# Patient Record
Sex: MALE | Race: WHITE | NOT HISPANIC OR LATINO | Employment: OTHER | URBAN - NONMETROPOLITAN AREA
[De-identification: names, ages, dates, MRNs, and addresses within clinical notes are randomized per-mention and may not be internally consistent; named-entity substitution may affect disease eponyms.]

---

## 2023-09-27 PROCEDURE — 88305 TISSUE EXAM BY PATHOLOGIST: CPT | Performed by: SURGERY

## 2023-09-28 ENCOUNTER — LAB REQUISITION (OUTPATIENT)
Dept: LAB | Facility: HOSPITAL | Age: 68
End: 2023-09-28
Payer: MEDICARE

## 2023-09-30 LAB
CYTO UR: NORMAL
LAB AP CASE REPORT: NORMAL
LAB AP CLINICAL INFORMATION: NORMAL
Lab: NORMAL
PATH REPORT.FINAL DX SPEC: NORMAL
PATH REPORT.GROSS SPEC: NORMAL

## 2024-04-25 ENCOUNTER — HOSPITAL ENCOUNTER (OUTPATIENT)
Dept: PREADMISSION TESTING | Age: 69
Discharge: HOME | End: 2024-04-29
Payer: MEDICARE

## 2024-04-25 LAB
ANION GAP SERPL CALCULATED.3IONS-SCNC: 10 MMOL/L (ref 7–19)
APTT PPP: 32.2 SEC (ref 26–36.2)
BUN SERPL-MCNC: 11 MG/DL (ref 8–23)
CALCIUM SERPL-MCNC: 9.5 MG/DL (ref 8.8–10.2)
CHLORIDE SERPL-SCNC: 100 MMOL/L (ref 98–111)
CO2 SERPL-SCNC: 31 MMOL/L (ref 22–29)
CREAT SERPL-MCNC: 0.8 MG/DL (ref 0.5–1.2)
ERYTHROCYTE [DISTWIDTH] IN BLOOD BY AUTOMATED COUNT: 13.7 % (ref 11.5–14.5)
GLUCOSE SERPL-MCNC: 80 MG/DL (ref 74–109)
HCT VFR BLD AUTO: 46.8 % (ref 42–52)
HGB BLD-MCNC: 15 G/DL (ref 14–18)
INR PPP: 1.1 (ref 0.88–1.18)
MCH RBC QN AUTO: 28.7 PG (ref 27–31)
MCHC RBC AUTO-ENTMCNC: 32.1 G/DL (ref 33–37)
MCV RBC AUTO: 89.5 FL (ref 80–94)
MRSA DNA SPEC QL NAA+PROBE: NOT DETECTED
PLATELET # BLD AUTO: 194 K/UL (ref 130–400)
PMV BLD AUTO: 10 FL (ref 9.4–12.4)
POTASSIUM SERPL-SCNC: 4.4 MMOL/L (ref 3.5–5)
PROTHROMBIN TIME: 13.8 SEC (ref 12–14.6)
RBC # BLD AUTO: 5.23 M/UL (ref 4.7–6.1)
SODIUM SERPL-SCNC: 141 MMOL/L (ref 136–145)
WBC # BLD AUTO: 8.4 K/UL (ref 4.8–10.8)

## 2024-04-25 PROCEDURE — 80048 BASIC METABOLIC PNL TOTAL CA: CPT

## 2024-04-25 PROCEDURE — 85730 THROMBOPLASTIN TIME PARTIAL: CPT

## 2024-04-25 PROCEDURE — 87641 MR-STAPH DNA AMP PROBE: CPT

## 2024-04-25 PROCEDURE — 85610 PROTHROMBIN TIME: CPT

## 2024-04-25 PROCEDURE — 93005 ELECTROCARDIOGRAM TRACING: CPT | Performed by: ANESTHESIOLOGY

## 2024-04-25 PROCEDURE — 85027 COMPLETE CBC AUTOMATED: CPT

## 2024-04-25 RX ORDER — MELOXICAM 7.5 MG/1
7.5 TABLET ORAL DAILY
COMMUNITY

## 2024-04-25 RX ORDER — TAMSULOSIN HYDROCHLORIDE 0.4 MG/1
0.4 CAPSULE ORAL DAILY
COMMUNITY

## 2024-04-25 RX ORDER — ASPIRIN 81 MG/1
81 TABLET ORAL DAILY
COMMUNITY

## 2024-04-25 RX ORDER — MULTIVIT-MIN/FA/LYCOPEN/LUTEIN .4-300-25
1 TABLET ORAL DAILY
COMMUNITY

## 2024-04-25 RX ORDER — LEVOTHYROXINE SODIUM 0.03 MG/1
25 TABLET ORAL DAILY
COMMUNITY

## 2024-04-25 NOTE — PROGRESS NOTES
Patient states he is coming to surgery by public transportation. I explained to patient that he can come by transport but could not be released to them. He stated he has no one he can have help him and he would not be able to find a ride. He also stated he needed to be kept in the hospital for a day or two because he lives by himself and does not have anyone to help him. Doctor Nolvia's nurse notified of all the above.

## 2024-04-25 NOTE — DISCHARGE INSTRUCTIONS
You will be contacted by someone from same-day surgery between 12-3 pm the day before your surgery regarding your arrival time. Please check your voicemail as they may leave a message with that information.  If you do not receive a call or voicemail or you have a problem please call [phone].    If you are running late the morning of your surgery or you wake up with signs/symptoms of COVID call [phone] for instructions    You will be scheduled to arrive 11/2-2 hours prior to your surgery time. Do not come earlier than when you are told to arrive.  You will come to the [facility] to register and be taken to the outpatient services area.    Do Not eat or drink anything after midnight. That includes candy, gum and mints, coffee, tea and water.    If appropriate, you may have a small sip of water with medications as instructed by your nurse/surgeon.  Take your levothyroxine and tamsulosin the morning of surgery.  This is for your protection and to avoid food and liquid from getting into your lungs.     Always, follow your surgeon's instructions on any blood thinner or aspirin use before surgery.    Brush your teeth and shower the morning of surgery.  Chlorhexidine Gluconate 4% Solution  Use the night of the 5th, the night of the 6th and the morning of the 7th.  Patient should shower with this soap a minimum of 3 consecutive showers (2 nights before surgery, the night before surgery and the morning of surgery) washing from the neck down (avoiding contact with genitalia).      DO NOT WASH YOUR HAIR OR FACE WITH THIS SOAP.  When washing with this soap, apply enough to suds up the body thoroughly, turn the water away from your body and allow the soap suds to remain on the body for 2 full minutes, then rinse body completely.      After using this soap on the body, please do not apply powders or lotions to your body.  After the shower the night before surgery, please dry off with a new towel, sleep in

## 2024-04-27 LAB
EKG P AXIS: -18 DEGREES
EKG P-R INTERVAL: 168 MS
EKG Q-T INTERVAL: 366 MS
EKG QRS DURATION: 92 MS
EKG QTC CALCULATION (BAZETT): 387 MS
EKG T AXIS: 20 DEGREES

## 2024-04-27 PROCEDURE — 93010 ELECTROCARDIOGRAM REPORT: CPT | Performed by: INTERNAL MEDICINE

## 2024-05-06 NOTE — DISCHARGE INSTR - DIET
Good nutrition is important when healing from an illness, injury, or surgery.  Follow any nutrition recommendations given to you during your hospital stay.   If you were given an oral nutrition supplement while in the hospital, continue to take this supplement at home.  You can take it with meals, in-between meals, and/or before bedtime. These supplements can be purchased at most local grocery stores, pharmacies, and chain [employer]stores.   If you have any questions about your diet or nutrition, call the hospital and ask for the dietitian.             Low carb / High protein

## 2024-05-06 NOTE — DISCHARGE INSTRUCTIONS
[facility]  Dr. Pj Tran      Total Knee and Uni Knee Replacement  Discharge Instructions     To prevent blood clots, you have been placed on the following medication:  Aspirin 81 mg twice a day for four weeks    Surgical Site Care:  Showering is permitted on post op day 2 - Thursday  No submersion in a bath, swimming pool, whirlpool, etc    Physical Therapy:  You were given a prescription for outpatient therapy.  Please schedule with [facility]  if convenient by calling 1 [phone]. Otherwise, schedule with a therapist closer to your home  Work on range of motion.    Goal for your first office visit is for you to fully straighten and bend your knee to at least a right angle   Don't walk for exercise (it will make you more sore)  Weight Bearing Status:  Full weight bearing with a walker     Pain Medications  You were given a prescription to fill at your pharmacy  Wean off pain medications as you deem appropriate as long as pain is under control  Take tylenol instead of the prescribed pain medicine as your pain improves    Cold packs                                                                                                               FEVER .5 or less        May be used as necessary                                                                    Take Tylenol x 2                                                                                                            Deep breath x 10   Please take a stool softener such as dulcolax or colace daily                                 Cough, cough, cough                                                                                                                        Recheck in 1 - 11/2                                                                                                                                            hours  Do not drive until surgeon releases you  DO NOT SMOKE, VAPE OR CHEW!!!    Contact office

## 2024-05-07 ENCOUNTER — ANESTHESIA EVENT (OUTPATIENT)
Dept: OPERATING ROOM | Age: 69
End: 2024-05-07
Payer: MEDICARE

## 2024-05-07 ENCOUNTER — ANESTHESIA (OUTPATIENT)
Dept: OPERATING ROOM | Age: 69
End: 2024-05-07
Payer: MEDICARE

## 2024-05-07 ENCOUNTER — HOSPITAL ENCOUNTER (OUTPATIENT)
Age: 69
Setting detail: OBSERVATION
Discharge: HOME HEALTH | End: 2024-05-08
Attending: ORTHOPAEDIC SURGERY | Admitting: ORTHOPAEDIC SURGERY
Payer: MEDICARE

## 2024-05-07 DIAGNOSIS — M17.11 PRIMARY OSTEOARTHRITIS OF RIGHT KNEE: Primary | ICD-10-CM

## 2024-05-07 PROCEDURE — 2720000010 HC SURG SUPPLY STERILE: Performed by: ORTHOPAEDIC SURGERY

## 2024-05-07 PROCEDURE — 6360000002 HC RX W HCPCS: Performed by: ORTHOPAEDIC SURGERY

## 2024-05-07 PROCEDURE — 2500000003 HC RX 250 WO HCPCS: Performed by: ORTHOPAEDIC SURGERY

## 2024-05-07 PROCEDURE — 2580000003 HC RX 258: Performed by: ORTHOPAEDIC SURGERY

## 2024-05-07 PROCEDURE — 2709999900 HC NON-CHARGEABLE SUPPLY: Performed by: ORTHOPAEDIC SURGERY

## 2024-05-07 PROCEDURE — 64447 NJX AA&/STRD FEMORAL NRV IMG: CPT

## 2024-05-07 PROCEDURE — C1713 ANCHOR/SCREW BN/BN,TIS/BN: HCPCS | Performed by: ORTHOPAEDIC SURGERY

## 2024-05-07 PROCEDURE — 6370000000 HC RX 637 (ALT 250 FOR IP): Performed by: ORTHOPAEDIC SURGERY

## 2024-05-07 PROCEDURE — 2500000003 HC RX 250 WO HCPCS

## 2024-05-07 PROCEDURE — 3700000001 HC ADD 15 MINUTES (ANESTHESIA): Performed by: ORTHOPAEDIC SURGERY

## 2024-05-07 PROCEDURE — A4217 STERILE WATER/SALINE, 500 ML: HCPCS | Performed by: ORTHOPAEDIC SURGERY

## 2024-05-07 PROCEDURE — 3600000015 HC SURGERY LEVEL 5 ADDTL 15MIN: Performed by: ORTHOPAEDIC SURGERY

## 2024-05-07 PROCEDURE — 7100000001 HC PACU RECOVERY - ADDTL 15 MIN: Performed by: ORTHOPAEDIC SURGERY

## 2024-05-07 PROCEDURE — 2580000003 HC RX 258

## 2024-05-07 PROCEDURE — 3700000000 HC ANESTHESIA ATTENDED CARE: Performed by: ORTHOPAEDIC SURGERY

## 2024-05-07 PROCEDURE — G0378 HOSPITAL OBSERVATION PER HR: HCPCS

## 2024-05-07 PROCEDURE — 2500000003 HC RX 250 WO HCPCS: Performed by: ANESTHESIOLOGY

## 2024-05-07 PROCEDURE — C1776 JOINT DEVICE (IMPLANTABLE): HCPCS | Performed by: ORTHOPAEDIC SURGERY

## 2024-05-07 PROCEDURE — 7100000000 HC PACU RECOVERY - FIRST 15 MIN: Performed by: ORTHOPAEDIC SURGERY

## 2024-05-07 PROCEDURE — 6360000002 HC RX W HCPCS: Performed by: ANESTHESIOLOGY

## 2024-05-07 PROCEDURE — 94760 N-INVAS EAR/PLS OXIMETRY 1: CPT

## 2024-05-07 PROCEDURE — 3600000005 HC SURGERY LEVEL 5 BASE: Performed by: ORTHOPAEDIC SURGERY

## 2024-05-07 PROCEDURE — 6360000002 HC RX W HCPCS

## 2024-05-07 PROCEDURE — 94150 VITAL CAPACITY TEST: CPT

## 2024-05-07 DEVICE — CEMENT BONE 40GM HI VISC PALACOS R: Type: IMPLANTABLE DEVICE | Site: KNEE | Status: FUNCTIONAL

## 2024-05-07 DEVICE — IMPLANTABLE DEVICE: Type: IMPLANTABLE DEVICE | Site: KNEE | Status: FUNCTIONAL

## 2024-05-07 DEVICE — PSN TIB STM 5 DEG SZ G R: Type: IMPLANTABLE DEVICE | Site: KNEE | Status: FUNCTIONAL

## 2024-05-07 DEVICE — IMPL KNEE FEM PSN PS CMT STD SZ12 RT: Type: IMPLANTABLE DEVICE | Site: KNEE | Status: FUNCTIONAL

## 2024-05-07 DEVICE — IMPL KNEE PSN ALL POLY PAT 38MM: Type: IMPLANTABLE DEVICE | Site: KNEE | Status: FUNCTIONAL

## 2024-05-07 RX ORDER — SODIUM CHLORIDE 0.9 % (FLUSH) 0.9 %
5-40 SYRINGE (ML) INJECTION EVERY 12 HOURS SCHEDULED
Status: DISCONTINUED | OUTPATIENT
Start: 2024-05-07 | End: 2024-05-08 | Stop reason: HOSPADM

## 2024-05-07 RX ORDER — ROPIVACAINE HYDROCHLORIDE 2 MG/ML
INJECTION, SOLUTION EPIDURAL; INFILTRATION; PERINEURAL PRN
Status: DISCONTINUED | OUTPATIENT
Start: 2024-05-07 | End: 2024-05-07 | Stop reason: ALTCHOICE

## 2024-05-07 RX ORDER — LIDOCAINE HYDROCHLORIDE 10 MG/ML
INJECTION, SOLUTION INFILTRATION; PERINEURAL
Status: COMPLETED | OUTPATIENT
Start: 2024-05-07 | End: 2024-05-07

## 2024-05-07 RX ORDER — SODIUM CHLORIDE 9 MG/ML
INJECTION, SOLUTION INTRAVENOUS PRN
Status: DISCONTINUED | OUTPATIENT
Start: 2024-05-07 | End: 2024-05-07 | Stop reason: HOSPADM

## 2024-05-07 RX ORDER — FENTANYL CITRATE 50 UG/ML
INJECTION, SOLUTION INTRAMUSCULAR; INTRAVENOUS PRN
Status: DISCONTINUED | OUTPATIENT
Start: 2024-05-07 | End: 2024-05-07 | Stop reason: SDUPTHER

## 2024-05-07 RX ORDER — NALOXONE HYDROCHLORIDE 0.4 MG/ML
INJECTION, SOLUTION INTRAMUSCULAR; INTRAVENOUS; SUBCUTANEOUS PRN
Status: DISCONTINUED | OUTPATIENT
Start: 2024-05-07 | End: 2024-05-07 | Stop reason: HOSPADM

## 2024-05-07 RX ORDER — ACETAMINOPHEN 325 MG/1
650 TABLET ORAL EVERY 6 HOURS
Status: DISCONTINUED | OUTPATIENT
Start: 2024-05-07 | End: 2024-05-08 | Stop reason: HOSPADM

## 2024-05-07 RX ORDER — ASPIRIN 325 MG
325 TABLET, DELAYED RELEASE (ENTERIC COATED) ORAL 2 TIMES DAILY
Status: DISCONTINUED | OUTPATIENT
Start: 2024-05-07 | End: 2024-05-08 | Stop reason: HOSPADM

## 2024-05-07 RX ORDER — SODIUM CHLORIDE, SODIUM LACTATE, POTASSIUM CHLORIDE, CALCIUM CHLORIDE 600; 310; 30; 20 MG/100ML; MG/100ML; MG/100ML; MG/100ML
INJECTION, SOLUTION INTRAVENOUS CONTINUOUS PRN
Status: DISCONTINUED | OUTPATIENT
Start: 2024-05-07 | End: 2024-05-07 | Stop reason: SDUPTHER

## 2024-05-07 RX ORDER — CELECOXIB 100 MG/1
100 CAPSULE ORAL ONCE
Status: COMPLETED | OUTPATIENT
Start: 2024-05-07 | End: 2024-05-07

## 2024-05-07 RX ORDER — OXYCODONE HYDROCHLORIDE 5 MG/1
5 TABLET ORAL EVERY 4 HOURS PRN
Status: DISCONTINUED | OUTPATIENT
Start: 2024-05-07 | End: 2024-05-08 | Stop reason: HOSPADM

## 2024-05-07 RX ORDER — ONDANSETRON 2 MG/ML
4 INJECTION INTRAMUSCULAR; INTRAVENOUS EVERY 6 HOURS PRN
Status: DISCONTINUED | OUTPATIENT
Start: 2024-05-07 | End: 2024-05-08 | Stop reason: HOSPADM

## 2024-05-07 RX ORDER — MELOXICAM 7.5 MG/1
3.75 TABLET ORAL DAILY
Status: DISCONTINUED | OUTPATIENT
Start: 2024-05-07 | End: 2024-05-08 | Stop reason: HOSPADM

## 2024-05-07 RX ORDER — SODIUM CHLORIDE 0.9 % (FLUSH) 0.9 %
5-40 SYRINGE (ML) INJECTION EVERY 12 HOURS SCHEDULED
Status: DISCONTINUED | OUTPATIENT
Start: 2024-05-07 | End: 2024-05-07 | Stop reason: HOSPADM

## 2024-05-07 RX ORDER — TRANEXAMIC ACID 650 MG/1
1950 TABLET ORAL
Status: COMPLETED | OUTPATIENT
Start: 2024-05-07 | End: 2024-05-07

## 2024-05-07 RX ORDER — SODIUM CHLORIDE 0.9 % (FLUSH) 0.9 %
5-40 SYRINGE (ML) INJECTION PRN
Status: DISCONTINUED | OUTPATIENT
Start: 2024-05-07 | End: 2024-05-07 | Stop reason: HOSPADM

## 2024-05-07 RX ORDER — ROPIVACAINE HYDROCHLORIDE 5 MG/ML
30 INJECTION, SOLUTION EPIDURAL; INFILTRATION; PERINEURAL ONCE
Status: DISCONTINUED | OUTPATIENT
Start: 2024-05-07 | End: 2024-05-07 | Stop reason: HOSPADM

## 2024-05-07 RX ORDER — SODIUM CHLORIDE, SODIUM LACTATE, POTASSIUM CHLORIDE, CALCIUM CHLORIDE 600; 310; 30; 20 MG/100ML; MG/100ML; MG/100ML; MG/100ML
INJECTION, SOLUTION INTRAVENOUS CONTINUOUS
Status: DISCONTINUED | OUTPATIENT
Start: 2024-05-07 | End: 2024-05-07 | Stop reason: HOSPADM

## 2024-05-07 RX ORDER — HYDROMORPHONE HYDROCHLORIDE 1 MG/ML
0.25 INJECTION, SOLUTION INTRAMUSCULAR; INTRAVENOUS; SUBCUTANEOUS EVERY 5 MIN PRN
Status: DISCONTINUED | OUTPATIENT
Start: 2024-05-07 | End: 2024-05-07 | Stop reason: HOSPADM

## 2024-05-07 RX ORDER — TAMSULOSIN HYDROCHLORIDE 0.4 MG/1
0.4 CAPSULE ORAL DAILY
Status: DISCONTINUED | OUTPATIENT
Start: 2024-05-07 | End: 2024-05-08 | Stop reason: HOSPADM

## 2024-05-07 RX ORDER — MORPHINE SULFATE 2 MG/ML
2 INJECTION, SOLUTION INTRAMUSCULAR; INTRAVENOUS
Status: DISCONTINUED | OUTPATIENT
Start: 2024-05-07 | End: 2024-05-08 | Stop reason: HOSPADM

## 2024-05-07 RX ORDER — HYDROMORPHONE HYDROCHLORIDE 1 MG/ML
0.5 INJECTION, SOLUTION INTRAMUSCULAR; INTRAVENOUS; SUBCUTANEOUS EVERY 5 MIN PRN
Status: DISCONTINUED | OUTPATIENT
Start: 2024-05-07 | End: 2024-05-07 | Stop reason: HOSPADM

## 2024-05-07 RX ORDER — EPHEDRINE SULFATE/0.9% NACL/PF 25 MG/5 ML
SYRINGE (ML) INTRAVENOUS PRN
Status: DISCONTINUED | OUTPATIENT
Start: 2024-05-07 | End: 2024-05-07 | Stop reason: SDUPTHER

## 2024-05-07 RX ORDER — PROPOFOL 10 MG/ML
INJECTION, EMULSION INTRAVENOUS CONTINUOUS PRN
Status: DISCONTINUED | OUTPATIENT
Start: 2024-05-07 | End: 2024-05-07 | Stop reason: SDUPTHER

## 2024-05-07 RX ORDER — ROPIVACAINE HYDROCHLORIDE 5 MG/ML
INJECTION, SOLUTION EPIDURAL; INFILTRATION; PERINEURAL
Status: COMPLETED | OUTPATIENT
Start: 2024-05-07 | End: 2024-05-07

## 2024-05-07 RX ORDER — MIDAZOLAM HYDROCHLORIDE 2 MG/2ML
2 INJECTION, SOLUTION INTRAMUSCULAR; INTRAVENOUS ONCE
Status: COMPLETED | OUTPATIENT
Start: 2024-05-07 | End: 2024-05-07

## 2024-05-07 RX ORDER — ACETAMINOPHEN 500 MG
1000 TABLET ORAL ONCE
Status: COMPLETED | OUTPATIENT
Start: 2024-05-07 | End: 2024-05-07

## 2024-05-07 RX ORDER — OXYCODONE HCL 10 MG/1
10 TABLET, FILM COATED, EXTENDED RELEASE ORAL ONCE
Status: COMPLETED | OUTPATIENT
Start: 2024-05-07 | End: 2024-05-07

## 2024-05-07 RX ORDER — OXYCODONE HYDROCHLORIDE 10 MG/1
10 TABLET ORAL EVERY 4 HOURS PRN
Status: DISCONTINUED | OUTPATIENT
Start: 2024-05-07 | End: 2024-05-08 | Stop reason: HOSPADM

## 2024-05-07 RX ORDER — LEVOTHYROXINE SODIUM 0.03 MG/1
25 TABLET ORAL DAILY
Status: DISCONTINUED | OUTPATIENT
Start: 2024-05-08 | End: 2024-05-08 | Stop reason: HOSPADM

## 2024-05-07 RX ORDER — DEXAMETHASONE SODIUM PHOSPHATE 10 MG/ML
8 INJECTION, SOLUTION INTRAMUSCULAR; INTRAVENOUS ONCE
Status: DISCONTINUED | OUTPATIENT
Start: 2024-05-07 | End: 2024-05-07 | Stop reason: HOSPADM

## 2024-05-07 RX ORDER — BUPIVACAINE HYDROCHLORIDE 7.5 MG/ML
INJECTION, SOLUTION INTRASPINAL PRN
Status: DISCONTINUED | OUTPATIENT
Start: 2024-05-07 | End: 2024-05-07 | Stop reason: SDUPTHER

## 2024-05-07 RX ORDER — SODIUM CHLORIDE 9 MG/ML
INJECTION, SOLUTION INTRAVENOUS PRN
Status: DISCONTINUED | OUTPATIENT
Start: 2024-05-07 | End: 2024-05-08 | Stop reason: HOSPADM

## 2024-05-07 RX ORDER — DEXMEDETOMIDINE HYDROCHLORIDE 100 UG/ML
INJECTION, SOLUTION INTRAVENOUS PRN
Status: DISCONTINUED | OUTPATIENT
Start: 2024-05-07 | End: 2024-05-07 | Stop reason: SDUPTHER

## 2024-05-07 RX ORDER — SCOLOPAMINE TRANSDERMAL SYSTEM 1 MG/1
1 PATCH, EXTENDED RELEASE TRANSDERMAL ONCE
Status: DISCONTINUED | OUTPATIENT
Start: 2024-05-07 | End: 2024-05-07

## 2024-05-07 RX ORDER — M-VIT,TX,IRON,MINS/CALC/FOLIC 27MG-0.4MG
1 TABLET ORAL DAILY
Status: DISCONTINUED | OUTPATIENT
Start: 2024-05-07 | End: 2024-05-08 | Stop reason: HOSPADM

## 2024-05-07 RX ORDER — MORPHINE SULFATE 4 MG/ML
4 INJECTION, SOLUTION INTRAMUSCULAR; INTRAVENOUS
Status: DISCONTINUED | OUTPATIENT
Start: 2024-05-07 | End: 2024-05-08 | Stop reason: HOSPADM

## 2024-05-07 RX ORDER — SODIUM CHLORIDE 0.9 % (FLUSH) 0.9 %
5-40 SYRINGE (ML) INJECTION PRN
Status: DISCONTINUED | OUTPATIENT
Start: 2024-05-07 | End: 2024-05-08 | Stop reason: HOSPADM

## 2024-05-07 RX ADMIN — OXYCODONE 5 MG: 5 TABLET ORAL at 19:16

## 2024-05-07 RX ADMIN — MORPHINE SULFATE 2 MG: 2 INJECTION, SOLUTION INTRAMUSCULAR; INTRAVENOUS at 19:50

## 2024-05-07 RX ADMIN — CELECOXIB 100 MG: 100 CAPSULE ORAL at 08:46

## 2024-05-07 RX ADMIN — ASPIRIN 325 MG: 325 TABLET, COATED ORAL at 21:48

## 2024-05-07 RX ADMIN — SODIUM CHLORIDE, SODIUM LACTATE, POTASSIUM CHLORIDE, AND CALCIUM CHLORIDE: 600; 310; 30; 20 INJECTION, SOLUTION INTRAVENOUS at 10:38

## 2024-05-07 RX ADMIN — MORPHINE SULFATE 2 MG: 2 INJECTION, SOLUTION INTRAMUSCULAR; INTRAVENOUS at 21:48

## 2024-05-07 RX ADMIN — FENTANYL CITRATE 25 MCG: 50 INJECTION, SOLUTION INTRAMUSCULAR; INTRAVENOUS at 10:52

## 2024-05-07 RX ADMIN — EPHEDRINE SULFATE 5 MG: 5 INJECTION INTRAVENOUS at 12:01

## 2024-05-07 RX ADMIN — OXYCODONE 5 MG: 5 TABLET ORAL at 18:17

## 2024-05-07 RX ADMIN — FENTANYL CITRATE 50 MCG: 0.05 INJECTION, SOLUTION INTRAMUSCULAR; INTRAVENOUS at 10:38

## 2024-05-07 RX ADMIN — FENTANYL CITRATE 50 MCG: 0.05 INJECTION, SOLUTION INTRAMUSCULAR; INTRAVENOUS at 10:45

## 2024-05-07 RX ADMIN — BUPIVACAINE HYDROCHLORIDE IN DEXTROSE 1.9 MG: 7.5 INJECTION, SOLUTION SUBARACHNOID at 10:52

## 2024-05-07 RX ADMIN — Medication 1 TABLET: at 15:00

## 2024-05-07 RX ADMIN — DEXMEDETOMIDINE HYDROCHLORIDE 8 MCG: 100 INJECTION, SOLUTION, CONCENTRATE INTRAVENOUS at 11:07

## 2024-05-07 RX ADMIN — DEXMEDETOMIDINE HYDROCHLORIDE 8 MCG: 100 INJECTION, SOLUTION, CONCENTRATE INTRAVENOUS at 11:14

## 2024-05-07 RX ADMIN — PROPOFOL 70 MCG/KG/MIN: 10 INJECTION, EMULSION INTRAVENOUS at 10:52

## 2024-05-07 RX ADMIN — ACETAMINOPHEN 650 MG: 325 TABLET ORAL at 21:48

## 2024-05-07 RX ADMIN — DEXMEDETOMIDINE HYDROCHLORIDE 8 MCG: 100 INJECTION, SOLUTION, CONCENTRATE INTRAVENOUS at 11:03

## 2024-05-07 RX ADMIN — DEXMEDETOMIDINE HYDROCHLORIDE 8 MCG: 100 INJECTION, SOLUTION, CONCENTRATE INTRAVENOUS at 10:58

## 2024-05-07 RX ADMIN — LIDOCAINE HYDROCHLORIDE 1 ML: 10 INJECTION, SOLUTION INFILTRATION; PERINEURAL at 10:33

## 2024-05-07 RX ADMIN — DEXMEDETOMIDINE HYDROCHLORIDE 10 MCG: 100 INJECTION, SOLUTION, CONCENTRATE INTRAVENOUS at 12:27

## 2024-05-07 RX ADMIN — SODIUM CHLORIDE, POTASSIUM CHLORIDE, SODIUM LACTATE AND CALCIUM CHLORIDE: 600; 310; 30; 20 INJECTION, SOLUTION INTRAVENOUS at 08:46

## 2024-05-07 RX ADMIN — OXYCODONE HYDROCHLORIDE 10 MG: 10 TABLET, FILM COATED, EXTENDED RELEASE ORAL at 08:47

## 2024-05-07 RX ADMIN — MIDAZOLAM 2 MG: 1 INJECTION INTRAMUSCULAR; INTRAVENOUS at 10:28

## 2024-05-07 RX ADMIN — TAMSULOSIN HYDROCHLORIDE 0.4 MG: 0.4 CAPSULE ORAL at 15:00

## 2024-05-07 RX ADMIN — TRANEXAMIC ACID 1950 MG: 650 TABLET ORAL at 08:46

## 2024-05-07 RX ADMIN — ONDANSETRON 4 MG: 2 INJECTION INTRAMUSCULAR; INTRAVENOUS at 19:44

## 2024-05-07 RX ADMIN — ACETAMINOPHEN 1000 MG: 500 TABLET ORAL at 08:46

## 2024-05-07 RX ADMIN — EPHEDRINE SULFATE 5 MG: 5 INJECTION INTRAVENOUS at 11:27

## 2024-05-07 RX ADMIN — ACETAMINOPHEN 650 MG: 325 TABLET ORAL at 14:59

## 2024-05-07 RX ADMIN — CEFAZOLIN 2000 MG: 2 INJECTION, POWDER, FOR SOLUTION INTRAMUSCULAR; INTRAVENOUS at 10:52

## 2024-05-07 RX ADMIN — WATER 2000 MG: 1 INJECTION INTRAMUSCULAR; INTRAVENOUS; SUBCUTANEOUS at 18:21

## 2024-05-07 RX ADMIN — DEXMEDETOMIDINE HYDROCHLORIDE 8 MCG: 100 INJECTION, SOLUTION, CONCENTRATE INTRAVENOUS at 10:52

## 2024-05-07 RX ADMIN — MELOXICAM 3.75 MG: 7.5 TABLET ORAL at 14:59

## 2024-05-07 RX ADMIN — OXYCODONE HYDROCHLORIDE 10 MG: 10 TABLET ORAL at 22:58

## 2024-05-07 RX ADMIN — ROPIVACAINE HYDROCHLORIDE 20 ML: 5 INJECTION, SOLUTION EPIDURAL; INFILTRATION; PERINEURAL at 10:33

## 2024-05-07 SDOH — ECONOMIC STABILITY: FOOD INSECURITY: WITHIN THE PAST 12 MONTHS, YOU WORRIED THAT YOUR FOOD WOULD RUN OUT BEFORE YOU GOT MONEY TO BUY MORE.: NEVER TRUE

## 2024-05-07 SDOH — ECONOMIC STABILITY: INCOME INSECURITY: IN THE PAST 12 MONTHS, HAS THE ELECTRIC, GAS, OIL, OR WATER COMPANY THREATENED TO SHUT OFF SERVICE IN YOUR HOME?: NO

## 2024-05-07 SDOH — ECONOMIC STABILITY: INCOME INSECURITY: HOW HARD IS IT FOR YOU TO PAY FOR THE VERY BASICS LIKE FOOD, HOUSING, MEDICAL CARE, AND HEATING?: NOT VERY HARD

## 2024-05-07 ASSESSMENT — PAIN DESCRIPTION - LOCATION
LOCATION: LEG
LOCATION: HIP
LOCATION: HIP
LOCATION: LEG

## 2024-05-07 ASSESSMENT — PATIENT HEALTH QUESTIONNAIRE - PHQ9
SUM OF ALL RESPONSES TO PHQ QUESTIONS 1-9: 0
1. LITTLE INTEREST OR PLEASURE IN DOING THINGS: NOT AT ALL
SUM OF ALL RESPONSES TO PHQ QUESTIONS 1-9: 0
SUM OF ALL RESPONSES TO PHQ9 QUESTIONS 1 & 2: 0
2. FEELING DOWN, DEPRESSED OR HOPELESS: NOT AT ALL

## 2024-05-07 ASSESSMENT — PAIN SCALES - GENERAL
PAINLEVEL_OUTOF10: 6
PAINLEVEL_OUTOF10: 6
PAINLEVEL_OUTOF10: 8
PAINLEVEL_OUTOF10: 0
PAINLEVEL_OUTOF10: 4

## 2024-05-07 ASSESSMENT — LIFESTYLE VARIABLES: SMOKING_STATUS: 0

## 2024-05-07 NOTE — ANESTHESIA POSTPROCEDURE EVALUATION
Department of Anesthesiology  Postprocedure Note    Patient: Grant Loaiza  MRN: [id number]  YOB: 1955  Date of evaluation: 5/7/2024    Procedure Summary       Date: 05/07/24 Room / Location: [address]    Anesthesia Start: 1038 Anesthesia Stop: 1233    Procedure: ROBOTIC RIGHT TOTAL KNEE ARTHROPLASTY (Right: Knee) Diagnosis:       Bilateral primary osteoarthritis of knee      (Bilateral primary osteoarthritis of knee [M17.0])    Surgeons: Isaac De Souza MD Responsible Provider: Maverick Laureano APRN - CRNA    Anesthesia Type: Regional, Spinal, MAC ASA Status: 2            Anesthesia Type: Regional, Spinal, MAC    Guru Phase I: Guru Score: 10    Guru Phase II:      Anesthesia Post Evaluation    Patient location during evaluation: PACU  Patient participation: complete - patient participated  Level of consciousness: awake and alert  Pain score: 0  Airway patency: patent  Nausea & Vomiting: no vomiting and no nausea  Cardiovascular status: blood pressure returned to baseline and hemodynamically stable  Respiratory status: acceptable, nonlabored ventilation, room air and spontaneous ventilation  Hydration status: euvolemic  Comments: /81   Pulse 66   Temp 97.2 °F (36.2 °C) (Temporal)   Resp 18   Ht 1.803 m (5' 11\")   Wt 80.7 kg (178 lb)   SpO2 94%   BMI 24.83 kg/m²     Multimodal analgesia pain management approach  Pain management: adequate    No notable events documented.

## 2024-05-07 NOTE — H&P
[facility] Pre-Operative History and Physical    Patient Name: Shannan  : 1955        Chief Complaint: Right knee pain  History of Present Illness:   This patient has had ongoing pain for several weeks/months that has been unresponsive to conservative care which has included injection, therapy, activity modification and presents now for surgery.    Past Medical History:       Diagnosis Date    Anxiety     Arthritis     BPH (benign prostatic hyperplasia)     Thyroid disease      Past Surgical History:       Procedure Laterality Date    CATARACT EXTRACTION EXTRACAPSULAR W/ INTRAOCULAR LENS IMPLANTATION Bilateral     CYST REMOVAL      right hand    EAR SURGERY Left     reattachment    LEG SURGERY Left     tendon reattached    SKIN BIOPSY      TONSILLECTOMY         Medications:   Prior to Admission medications    Medication Sig Start Date End Date Taking? Authorizing Provider   Multiple Vitamins-Minerals (CENTRUM SILVER ADULT 50+) TABS Take 1 tablet by mouth daily    Martha Ugarte MD   aspirin 81 MG EC tablet Take 1 tablet by mouth daily    Martha Ugarte MD   levothyroxine (SYNTHROID) 25 MCG tablet Take 1 tablet by mouth Daily    Martha Ugarte MD   meloxicam (MOBIC) 7.5 MG tablet Take 1 tablet by mouth daily    Martha Ugarte MD   tamsulosin (FLOMAX) 0.4 MG capsule Take 1 capsule by mouth daily    Martha Ugarte MD       Allergies:  Demerol hcl [meperidine] and Tuberculin tests    Social History:   Tobacco:  reports that he has never smoked. He has never used smokeless tobacco.   Alcohol:  reports that he does not currently use alcohol.    Review of Systems:  General: Denies any fever or chills  EYES: Denies any diplopia  ENT: Tinnitus or vertigo  Resp: Denies any shortness of breath, cough or wheezing  Cardiac: Denies any chest pain, palpitations, claudication or edema  GI: Denies any melena, hematochezia, hematemesis or pyrosis  : Denies any frequency, urgency,

## 2024-05-07 NOTE — ANESTHESIA PRE PROCEDURE
04/25/2024 03:10 PM    K 4.4 04/25/2024 03:10 PM     04/25/2024 03:10 PM    CO2 31 04/25/2024 03:10 PM    BUN 11 04/25/2024 03:10 PM    CREATININE 0.8 04/25/2024 03:10 PM    LABGLOM >90 04/25/2024 03:10 PM    GLUCOSE 80 04/25/2024 03:10 PM    CALCIUM 9.5 04/25/2024 03:10 PM       POC Tests: No results for input(s): \"POCGLU\", \"POCNA\", \"POCK\", \"POCCL\", \"POCBUN\", \"POCHEMO\", \"POCHCT\" in the last 72 hours.    Coags:   Lab Results   Component Value Date/Time    PROTIME 13.8 04/25/2024 03:10 PM    INR 1.10 04/25/2024 03:10 PM    APTT 32.2 04/25/2024 03:10 PM       HCG (If Applicable): No results found for: \"PREGTESTUR\", \"PREGSERUM\", \"HCG\", \"HCGQUANT\"     ABGs: No results found for: \"PHART\", \"PO2ART\", \"OMI3DFP\", \"MNA5UNP\", \"BEART\", \"S8OTNFIR\"     Type & Screen (If Applicable):  No results found for: \"LABABO\"    Drug/Infectious Status (If Applicable):  No results found for: \"HIV\", \"HEPCAB\"    COVID-19 Screening (If Applicable): No results found for: \"COVID19\"        Anesthesia Evaluation  Patient summary reviewed and Nursing notes reviewed   no history of anesthetic complications:   Airway: Mallampati: II  TM distance: >3 FB   Neck ROM: full  Mouth opening: > = 3 FB   Dental:          Pulmonary:normal exam        (-) COPD, asthma and not a current smoker                           Cardiovascular:        (-) past MI and CABG/stent    ECG reviewed               Beta Blocker:  Not on Beta Blocker         Neuro/Psych:      (-) seizures and CVA           GI/Hepatic/Renal:   (+) GERD: well controlled     (-) liver disease and no renal disease       Endo/Other:    (+) hypothyroidism: arthritis:..    (-) diabetes mellitus               Abdominal:             Vascular: negative vascular ROS.         Other Findings:       Anesthesia Plan      regional and spinal     ASA 2     (Adductor canal block)  Induction: intravenous.    MIPS: Postoperative opioids intended and Prophylactic antiemetics administered.  Anesthetic plan and

## 2024-05-07 NOTE — PROGRESS NOTES
4 Eyes Skin Assessment     NAME:  Grant Loaiza  YOB: 1955  MEDICAL RECORD NUMBER:  [id number]    The patient is being assessed for  Post-Op Surgical    I agree that at least one RN has performed a thorough Head to Toe Skin Assessment on the patient. ALL assessment sites listed below have been assessed.      Areas assessed by both nurses:    Head, Face, Ears, Shoulders, Back, Chest, Arms, Elbows, Hands, Sacrum. Buttock, Coccyx, Ischium, and Legs. Feet and Heels        Does the Patient have a Wound? No noted wound(s)       Kurtis Prevention initiated by RN: No  Wound Care Orders initiated by RN: No    Pressure Injury (Stage 3,4, Unstageable, DTI, NWPT, and Complex wounds) if present, place Wound referral order by RN under : No    New Ostomies, if present place, Ostomy referral order under : No     Nurse 1 eSignature: Electronically signed by Kay Murray RN on 5/7/24 at 2:18 PM CDT    **SHARE this note so that the co-signing nurse can place an eSignature**    Nurse 2 eSignature: Electronically signed by Catherine Akins RN on 5/7/24 at 2:24 PM CDT

## 2024-05-07 NOTE — ANESTHESIA PROCEDURE NOTES
Spinal Block    End time: 5/7/2024 10:52 AM  Reason for block: primary anesthetic  Staffing  Performed by: Maverick Laureano APRN - CRNA  Authorized by: Maverick Laureano APRN - CRNA    Spinal Block  Patient position: sitting  Prep: Betadine  Patient monitoring: oxygen, frequent blood pressure checks, continuous capnometry, continuous pulse ox and cardiac monitor  Approach: midline  Location: L3/L4  Provider prep: sterile gloves and mask  Needle  Needle gauge: 24 G  Needle length: 4 in  Assessment  Sensory level: T8  Swirl obtained: Yes  CSF: clear  Attempts: 2  Hemodynamics: stable  Preanesthetic Checklist  Completed: patient identified, IV checked, site marked, risks and benefits discussed, surgical/procedural consents, equipment checked, pre-op evaluation, timeout performed, anesthesia consent given, oxygen available, monitors applied/VS acknowledged, fire risk safety assessment completed and verbalized and blood product R/B/A discussed and consented          
(NAROPIN) injection 0.5% - Perineural   20 mL - 5/7/2024 10:33:00 AM

## 2024-05-07 NOTE — PROGRESS NOTES
Dr. Galvez to bedside, updated on HR trends and BP trends. Ok to floor. No new orders at this time.

## 2024-05-07 NOTE — OP NOTE
TOTAL KNEE ARTHROPLASTY OPERATIVE NOTE    NAME OF SURGEON / : Pj Tran MD  PATIENT:   Grant Loaiza  Date: 5/7/2024        Time: 12:12 PM   Referring Physician: ________________________    PREOP DIAGNOSIS:  right Knee  Primary osteoarthritis   POSTOP DIAGNOSIS:  Same     PROCEDURE:    Right  Cemented Posterior Stabilized Knee arthroplasty, SHRUTHI robot assisted    IMPLANTS:   Implant Name Type Inv. Item Serial No.  Lot No. LRB No. Used Action   CEMENT BONE 40GM HI VISC PALACOS R - [id number]  CEMENT BONE 40GM HI VISC PALACOS R  [facility] [id number] Right 1 Implanted   CEMENT BONE 40GM HI VISC PALACOS R - [id number]  CEMENT BONE 40GM HI VISC PALACOS R  [facility] [id number] Right 1 Implanted   IMPL KNEE PSN ALL POLY PAT 38MM - [id number] Knee IMPL KNEE PSN ALL POLY PAT 38MM  [employer] [id number] Right 1 Implanted   PSN TIB STM 5 DEG SZ G R - [id number]  PSN TIB STM 5 DEG SZ G R  [employer] [id number] Right 1 Implanted   IMPL KNEE FEM PSN PS CMT STD SZ12 RT - [id number] Knee IMPL KNEE FEM PSN PS CMT STD SZ12 RT  [employer] [id number] Right 1 Implanted   IMPL KNEE ARTIC 13MM RT - [id number] Knee IMPL KNEE ARTIC 13MM RT  [employer] [id number] Right 1 Implanted       FINDINGS:  Preop ROM:  Full except for   - 5 degrees  extension  Alignment:    varus  Bone quality:   normal  Cartilage wear:  Medial - severe  Lateral - moderate  Pat-fem -moderate  ACL -Intact    Deviation from robotic plan:  adjusted the femoral external rotation    ASSISTANT: Miguel Burns, certified first assistant.  Helped with draping, exposure, retraction, essential steps of the procedure, and with wound closure.   ANESTHESIA:  General  EBL:  500 mL  TOURNIQUET:  none  FLUIDS: See anesthesia record  BLOOD PRODUCTS:  None  COMPLICATIONS:  None  SPECIMEN:  None            INDICATIONS:  Patient presents for the above procedure having failed conservative treatment.  Patient consents to the

## 2024-05-08 VITALS
HEART RATE: 70 BPM | OXYGEN SATURATION: 99 % | WEIGHT: 178 LBS | RESPIRATION RATE: 16 BRPM | TEMPERATURE: 98.8 F | BODY MASS INDEX: 24.92 KG/M2 | DIASTOLIC BLOOD PRESSURE: 71 MMHG | HEIGHT: 71 IN | SYSTOLIC BLOOD PRESSURE: 112 MMHG

## 2024-05-08 LAB
ANION GAP SERPL CALCULATED.3IONS-SCNC: 9 MMOL/L (ref 7–19)
BUN SERPL-MCNC: 15 MG/DL (ref 8–23)
CALCIUM SERPL-MCNC: 8.3 MG/DL (ref 8.8–10.2)
CHLORIDE SERPL-SCNC: 103 MMOL/L (ref 98–111)
CO2 SERPL-SCNC: 26 MMOL/L (ref 22–29)
CREAT SERPL-MCNC: 0.7 MG/DL (ref 0.5–1.2)
GLUCOSE SERPL-MCNC: 123 MG/DL (ref 74–109)
HCT VFR BLD AUTO: 37.7 % (ref 42–52)
HGB BLD-MCNC: 12.3 G/DL (ref 14–18)
POTASSIUM SERPL-SCNC: 4.2 MMOL/L (ref 3.5–5)
SODIUM SERPL-SCNC: 138 MMOL/L (ref 136–145)

## 2024-05-08 PROCEDURE — 36415 COLL VENOUS BLD VENIPUNCTURE: CPT

## 2024-05-08 PROCEDURE — 85018 HEMOGLOBIN: CPT

## 2024-05-08 PROCEDURE — 96374 THER/PROPH/DIAG INJ IV PUSH: CPT

## 2024-05-08 PROCEDURE — 80048 BASIC METABOLIC PNL TOTAL CA: CPT

## 2024-05-08 PROCEDURE — 6360000002 HC RX W HCPCS: Performed by: ORTHOPAEDIC SURGERY

## 2024-05-08 PROCEDURE — 96361 HYDRATE IV INFUSION ADD-ON: CPT

## 2024-05-08 PROCEDURE — 6370000000 HC RX 637 (ALT 250 FOR IP): Performed by: STUDENT IN AN ORGANIZED HEALTH CARE EDUCATION/TRAINING PROGRAM

## 2024-05-08 PROCEDURE — G0378 HOSPITAL OBSERVATION PER HR: HCPCS

## 2024-05-08 PROCEDURE — 97530 THERAPEUTIC ACTIVITIES: CPT

## 2024-05-08 PROCEDURE — 94760 N-INVAS EAR/PLS OXIMETRY 1: CPT

## 2024-05-08 PROCEDURE — 6370000000 HC RX 637 (ALT 250 FOR IP): Performed by: ORTHOPAEDIC SURGERY

## 2024-05-08 PROCEDURE — 2580000003 HC RX 258: Performed by: ORTHOPAEDIC SURGERY

## 2024-05-08 PROCEDURE — 85014 HEMATOCRIT: CPT

## 2024-05-08 PROCEDURE — 97161 PT EVAL LOW COMPLEX 20 MIN: CPT

## 2024-05-08 PROCEDURE — 97116 GAIT TRAINING THERAPY: CPT

## 2024-05-08 RX ORDER — POLYETHYLENE GLYCOL 3350 17 G/17G
17 POWDER, FOR SOLUTION ORAL 2 TIMES DAILY
Status: DISCONTINUED | OUTPATIENT
Start: 2024-05-08 | End: 2024-05-08 | Stop reason: HOSPADM

## 2024-05-08 RX ORDER — OXYCODONE HYDROCHLORIDE 5 MG/1
5 TABLET ORAL EVERY 4 HOURS PRN
Qty: 50 TABLET | Refills: 0 | Status: SHIPPED | OUTPATIENT
Start: 2024-05-08 | End: 2024-05-16

## 2024-05-08 RX ORDER — ASPIRIN 81 MG/1
81 TABLET, CHEWABLE ORAL 2 TIMES DAILY
Qty: 60 TABLET | Refills: 0 | Status: SHIPPED | OUTPATIENT
Start: 2024-05-08

## 2024-05-08 RX ORDER — IBUPROFEN 400 MG/1
400 TABLET ORAL EVERY 8 HOURS PRN
Qty: 30 TABLET | Refills: 0 | Status: SHIPPED | OUTPATIENT
Start: 2024-05-08

## 2024-05-08 RX ADMIN — Medication 1 TABLET: at 09:01

## 2024-05-08 RX ADMIN — OXYCODONE 5 MG: 5 TABLET ORAL at 04:12

## 2024-05-08 RX ADMIN — ACETAMINOPHEN 650 MG: 325 TABLET ORAL at 09:01

## 2024-05-08 RX ADMIN — POLYETHYLENE GLYCOL (3350) 17 G: 17 POWDER, FOR SOLUTION ORAL at 09:04

## 2024-05-08 RX ADMIN — OXYCODONE HYDROCHLORIDE 10 MG: 10 TABLET ORAL at 09:06

## 2024-05-08 RX ADMIN — ACETAMINOPHEN 650 MG: 325 TABLET ORAL at 03:03

## 2024-05-08 RX ADMIN — LEVOTHYROXINE SODIUM 25 MCG: 25 TABLET ORAL at 05:07

## 2024-05-08 RX ADMIN — WATER 2000 MG: 1 INJECTION INTRAMUSCULAR; INTRAVENOUS; SUBCUTANEOUS at 03:03

## 2024-05-08 RX ADMIN — ASPIRIN 325 MG: 325 TABLET, COATED ORAL at 09:01

## 2024-05-08 RX ADMIN — MORPHINE SULFATE 2 MG: 2 INJECTION, SOLUTION INTRAMUSCULAR; INTRAVENOUS at 07:00

## 2024-05-08 RX ADMIN — TAMSULOSIN HYDROCHLORIDE 0.4 MG: 0.4 CAPSULE ORAL at 09:01

## 2024-05-08 RX ADMIN — MELOXICAM 3.75 MG: 7.5 TABLET ORAL at 09:01

## 2024-05-08 RX ADMIN — OXYCODONE 5 MG: 5 TABLET ORAL at 03:03

## 2024-05-08 ASSESSMENT — PAIN SCALES - GENERAL
PAINLEVEL_OUTOF10: 4
PAINLEVEL_OUTOF10: 4
PAINLEVEL_OUTOF10: 5
PAINLEVEL_OUTOF10: 5
PAINLEVEL_OUTOF10: 6

## 2024-05-08 ASSESSMENT — PAIN DESCRIPTION - LOCATION
LOCATION: KNEE
LOCATION: KNEE

## 2024-05-08 ASSESSMENT — PAIN DESCRIPTION - DESCRIPTORS
DESCRIPTORS: ACHING
DESCRIPTORS: ACHING

## 2024-05-08 ASSESSMENT — PAIN - FUNCTIONAL ASSESSMENT
PAIN_FUNCTIONAL_ASSESSMENT: PREVENTS OR INTERFERES SOME ACTIVE ACTIVITIES AND ADLS
PAIN_FUNCTIONAL_ASSESSMENT: PREVENTS OR INTERFERES SOME ACTIVE ACTIVITIES AND ADLS

## 2024-05-08 ASSESSMENT — PAIN DESCRIPTION - ORIENTATION
ORIENTATION: RIGHT
ORIENTATION: RIGHT

## 2024-05-08 NOTE — PROGRESS NOTES
CLINICAL PHARMACY NOTE: MEDS TO BEDS    Total # of Prescriptions Filled: 3   The following medications were delivered to the patient:  Current Discharge Medication List        START taking these medications    Details   oxyCODONE (ROXICODONE) 5 MG immediate release tablet Take 1 tablet by mouth every 4 hours as needed for Pain for up to 8 days. Max Daily Amount: 30 mg  Qty: 50 tablet, Refills: 0    Comments: Reduce doses taken as pain becomes manageable Reduce doses taken as pain becomes manageable  Associated Diagnoses: Primary osteoarthritis of right knee      aspirin (ASPIRIN CHILDRENS) 81 MG chewable tablet Take 1 tablet by mouth in the morning and at bedtime  Qty: 60 tablet, Refills: 0      ibuprofen (ADVIL;MOTRIN) 400 MG tablet Take 1 tablet by mouth every 8 hours as needed for Pain  Qty: 30 tablet, Refills: 0               Additional Documentation:  Delivered to patients room and handed to patient. Paid with card

## 2024-05-08 NOTE — PROGRESS NOTES
Subjective:     Post-Operative Day: 1 No complaints    Objective:     Patient Vitals for the past 24 hrs:   BP Temp Temp src Pulse Resp SpO2 Height Weight   05/08/24 0417 131/75 98.2 °F (36.8 °C) -- 65 16 100 % -- --   05/08/24 0245 102/69 -- -- -- -- -- -- --   05/08/24 0016 123/73 97.3 °F (36.3 °C) Temporal 74 18 99 % -- --   05/07/24 2258 (!) 110/58 -- -- -- -- -- -- --   05/07/24 2022 110/72 97.3 °F (36.3 °C) Temporal 67 18 98 % -- --   05/07/24 1918 113/78 97.5 °F (36.4 °C) Temporal 64 14 98 % -- --   05/07/24 1658 116/76 97.2 °F (36.2 °C) Temporal 58 14 100 % -- --   05/07/24 1551 113/67 96.8 °F (36 °C) Temporal 63 14 (!) 84 % -- --   05/07/24 1453 110/74 (!) 95.9 °F (35.5 °C) Temporal 88 14 97 % -- --   05/07/24 1428 -- -- -- 50 14 100 % -- --   05/07/24 1424 95/64 (!) 95.9 °F (35.5 °C) Temporal (!) 48 12 100 % -- --   05/07/24 1358 106/69 (!) 95.5 °F (35.3 °C) Temporal (!) 47 12 98 % -- --   05/07/24 1345 98/63 97 °F (36.1 °C) Temporal 56 14 96 % -- --   05/07/24 1340 98/66 -- -- (!) 47 15 99 % -- --   05/07/24 1335 (!) 100/58 -- -- 63 12 98 % -- --   05/07/24 1330 92/63 -- -- (!) 47 21 96 % -- --   05/07/24 1320 92/65 97.1 °F (36.2 °C) Temporal 50 11 99 % -- --   05/07/24 1315 (!) 88/61 -- -- 55 16 96 % -- --   05/07/24 1310 90/63 -- -- 54 13 95 % -- --   05/07/24 1305 (!) 89/61 -- -- 52 11 97 % -- --   05/07/24 1258 93/64 -- -- 54 16 -- -- --   05/07/24 1255 (!) 86/62 -- -- 53 15 97 % -- --   05/07/24 1250 (!) 93/59 -- -- 53 12 94 % -- --   05/07/24 1245 (!) 83/63 -- -- 56 11 95 % -- --   05/07/24 1240 (!) 89/57 -- -- 55 21 94 % -- --   05/07/24 1235 -- -- -- 54 -- 97 % -- --   05/07/24 1230 (!) 91/54 97.1 °F (36.2 °C) Temporal 54 -- 94 % -- --   05/07/24 1035 130/81 -- -- 66 18 94 % -- --   05/07/24 1030 (!) 146/86 -- -- 65 13 95 % -- --   05/07/24 1025 -- -- -- 66 21 95 % -- --   05/07/24 1020 137/80 -- -- 65 17 93 % -- --   05/07/24 1015 (!) 147/84 -- -- 64 16 96 % -- --   05/07/24 0832 122/76 97.2 °F

## 2024-05-08 NOTE — CARE COORDINATION
Kenya Patricia RN Ortho Navigator  [phone]    Patient would like dme item (walker with wheels) to be delivered to [facility] Room #519.  Patient is scheduled to discharge home today at 10:30 am.  Please call if you have any questions.  Electronically signed by Kenya Patricia RN on 5/8/2024 at 8:42 AM  Bill Loaiza  5/7/2024  8:14 AM   Admission  Description: 69 year old male Department: L 5 Surg Services     Patient Demographics    Name Patient ID SSN Gender Identity Birth Date   Josse Bill REYEZ [id number] Male [birth year] (69 yrs)     Address Phone Email     [address] [place] [id number] [phone] (H) [email or web address]       Reg Status PCP Date Last Verified Next Review Date    Verified Dennis Mijares APRN - NP  [phone] 04/25/24 05/25/24      Insurance Payors as of 5/8/2024    [employer] [facility]    Plan: [employer] [place]: [id number] Member: [id number]   Effective from: 1/1/2024 Subscriber: BILL LOAIZA Subscriber ID: [id number]   Guarantor: BILL LOAIZA     MEDICARE    Plan: MEDICARE PART B Member: [id number] Effective from: 6/1/2021   Subscriber: BILL LOAIZA Subscriber ID: [id number] Guarantor: BILL LOAIZA     Patient Contacts    Name Relation Home Work Mobile   michell loaiza Brother/Sister   [phone]   krishnasalomón Friend   [phone]   Electronically signed by Kenya Patricia RN on 5/8/2024 at 8:43 AM

## 2024-05-08 NOTE — PLAN OF CARE
Problem: Discharge Planning  Goal: Discharge to home or other facility with appropriate resources  5/8/2024 1058 by Courtney Nunez RN  Outcome: Adequate for Discharge  Flowsheets (Taken 5/8/2024 0735)  Discharge to home or other facility with appropriate resources:   Identify barriers to discharge with patient and caregiver   Arrange for needed discharge resources and transportation as appropriate   Identify discharge learning needs (meds, wound care, etc)   Arrange for interpreters to assist at discharge as needed   Refer to discharge planning if patient needs post-hospital services based on physician order or complex needs related to functional status, cognitive ability or social support system  5/7/2024 2334 by Karissa Oh, RN  Outcome: Progressing  Flowsheets (Taken 5/7/2024 2329)  Discharge to home or other facility with appropriate resources: Identify barriers to discharge with patient and caregiver     Problem: Safety - Adult  Goal: Free from fall injury  5/8/2024 1058 by Courtney Nunez RN  Outcome: Adequate for Discharge  Flowsheets (Taken 5/8/2024 0800)  Free From Fall Injury:   Instruct family/caregiver on patient safety   Based on caregiver fall risk screen, instruct family/caregiver to ask for assistance with transferring infant if caregiver noted to have fall risk factors  5/7/2024 2334 by Karissa Oh, RN  Outcome: Progressing  Flowsheets (Taken 5/7/2024 2333)  Free From Fall Injury: Instruct family/caregiver on patient safety     Problem: ABCDS Injury Assessment  Goal: Absence of physical injury  5/8/2024 1058 by Courtney Nunez, RN  Outcome: Adequate for Discharge  Flowsheets (Taken 5/8/2024 0800)  Absence of Physical Injury: Implement safety measures based on patient assessment  5/7/2024 2334 by Karissa Oh, RN  Outcome: Progressing  Flowsheets (Taken 5/7/2024 2333)  Absence of Physical Injury: Implement safety measures based on patient assessment

## 2024-05-08 NOTE — CARE COORDINATION
Spoke with patient regarding MD orders for HH services.  Pt agreeable and chose [facility]. Referral faxed and accepted, per Madhuri Please notify HH when patient discharges and Fax DC Summary,  DC med list and any new HH orders.     P. [phone]  F. [phone]  The Patient and/or patient representative was provided with a choice of provider and agrees   with the discharge plan. [x] Yes [] No    Freedom of choice list was provided with basic dialogue that supports the patient's individualized plan of care/goals, treatment preferences and shares the quality data associated with the providers. [x] Yes [] No  Electronically signed by Denise Martin on 5/8/24 at 10:04 AM CDT

## 2024-05-08 NOTE — PROGRESS NOTES
Patient discharged home today with [facility].  Went over all discharge instructions and new medications with patient and provided a copy of all new medications to take home.  Patient verbalized understanding.  Patient was stable upon discharge.  Electronically signed by Kenya Patricia RN on 5/8/2024 at 11:04 AM

## 2024-05-08 NOTE — DISCHARGE SUMMARY
[facility] [place] [facility]  Dr. Pj Tran  Discharge Summary    The Grant Loaiza is a 69 y.o. male underwent total knee replacement procedure without complication.  Grant Loaiza was admitted to the floor following his   recovery in the PACU.     Discharge Diagnosis   Right    Knee Replacement    Current Inpatient Medications    Current Facility-Administered Medications: polyethylene glycol (GLYCOLAX) packet 17 g, 17 g, Oral, BID  sodium chloride flush 0.9 % injection 5-40 mL, 5-40 mL, IntraVENous, 2 times per day  sodium chloride flush 0.9 % injection 5-40 mL, 5-40 mL, IntraVENous, PRN  0.9 % sodium chloride infusion, , IntraVENous, PRN  acetaminophen (TYLENOL) tablet 650 mg, 650 mg, Oral, Q6H  meloxicam (MOBIC) tablet 3.75 mg, 3.75 mg, Oral, Daily  oxyCODONE (ROXICODONE) immediate release tablet 5 mg, 5 mg, Oral, Q4H PRN **OR** oxyCODONE HCl (OXY-IR) immediate release tablet 10 mg, 10 mg, Oral, Q4H PRN  morphine (PF) injection 2 mg, 2 mg, IntraVENous, Q1H PRN **OR** morphine (PF) injection 4 mg, 4 mg, IntraVENous, Q1H PRN  aspirin EC tablet 325 mg, 325 mg, Oral, BID  levothyroxine (SYNTHROID) tablet 25 mcg, 25 mcg, Oral, Daily  therapeutic multivitamin-minerals 1 tablet, 1 tablet, Oral, Daily  tamsulosin (FLOMAX) capsule 0.4 mg, 0.4 mg, Oral, Daily  ondansetron (ZOFRAN) injection 4 mg, 4 mg, IntraVENous, Q6H PRN    Post-operatively the patient’s diet was advanced as tolerated and their incision was checked on POD #1.  The incision was clean, dry and intact with no signs of infection.  The patient remained neurovascularly intact in the lower extremity and had intact pulses distally.  Patient’s calf remained soft and showed no evidence of DVT.  The patient was able to move their leg and ankle/foot without any problems post-operatively.  Physical therapy and occupational therapy were consulted and began working with the patient post-operatively.  The patient progressed with PT/OT as would be

## 2024-05-08 NOTE — CONSULTS
Referring Provider: Dr. De Souza  Reason for Consultation: Medical comanagement    Patient Care Team:  Dennis Mijares APRN - NP as PCP - General (Nurse Practitioner)    Chief complaint right total knee arthroplasty    Subjective .     History of present illness:      69-year-old male with past medical history of BPH and hypothyroidism who presents for total knee arthroplasty.  His long history of knee pain that ultimately became resistant to conservative measures and he elected to proceed with total knee arthroplasty after careful consultation with Dr. De Souza as an outpatient.  He underwent the operation on 5/7 and tolerated procedure quite well.  He has no new postoperative complaints and is in the expected med postoperative pain.      REVIEW OF SYSTEMS:    CONSTITUTIONAL:  Negative for anorexia, chills, fevers, night sweats and weight loss  EYES:  negative for eye dryness, icterus and redness  HEENT:   negative for dental problems, epistaxis, facial trauma and thrush  RESPIRATORY:  negative for chest tightness, cough, dyspnea on exertion, pneumonia and sputum  CARDIOVASCULAR: negative for chest pain, dyspnea, exertional chest pressure/discomfort, irregular heart beat, palpitations, paroxysmal nocturnal dyspnea and syncope  GASTROINTESTINAL:  negative for abdominal pain, hematemesis, jaundice, melena and rectal bleeding  MUSCULOSKELETAL:  negative for muscle weakness, myalgias and neck pain, chronic joint pain noted  NEUROLOGICAL:   negative for dizziness, headaches, seizures, speech problems, tremors and vertigo  INTEGUMENT: negative for pruritus, rash, skin color change and skin lesion(s)   A Full 14 point review of systems is negative outside those listed above and in the HPI      History    Past Medical History:   Diagnosis Date    Anxiety     Arthritis     BPH (benign prostatic hyperplasia)     Thyroid disease      Past Surgical History:   Procedure Laterality Date    CATARACT EXTRACTION EXTRACAPSULAR

## 2024-05-08 NOTE — PROGRESS NOTES
Physical Therapy  Facility/Department: [facility] SURG SERVICES  Physical Therapy Initial Assessment    Name: Grant Loaiza  : 1955  MRN: [id number]  Date of Service: 2024    Discharge Recommendations:  Continue to assess pending progress, Patient would benefit from continued therapy after discharge          Patient Diagnosis(es): The encounter diagnosis was Primary osteoarthritis of right knee.  Past Medical History:  has a past medical history of Anxiety, Arthritis, BPH (benign prostatic hyperplasia), and Thyroid disease.  Past Surgical History:  has a past surgical history that includes Cataract extraction, extracapsular w/ intraocular lens implant (Bilateral); Tonsillectomy; Ear surgery (Left); Leg Surgery (Left); skin biopsy; cyst removal; and Total knee arthroplasty (Right, 2024).    Assessment   Body Structures, Functions, Activity Limitations Requiring Skilled Therapeutic Intervention: Decreased functional mobility ;Decreased strength;Decreased safe awareness;Increased pain;Decreased endurance  Assessment: pt WOULD BENEFIT FROM SKILLED PT IN THIS SETTING FOR POST OP TKR PROTOCOL  Therapy Prognosis: Good  Decision Making: Low Complexity  Requires PT Follow-Up: Yes  Activity Tolerance  Activity Tolerance: Patient tolerated treatment well     Plan   Physical Therapy Plan  General Plan: 5-7 times per week  Therapy Duration: 2 Weeks  Current Treatment Recommendations: Strengthening, ROM, Functional mobility training, Transfer training, Gait training, Pain management, Positioning, Safety education & training, Therapeutic activities  Safety Devices  Type of Devices: Gait belt, Call light within reach, Nurse notified     Restrictions  Restrictions/Precautions  Restrictions/Precautions: Weight Bearing  Lower Extremity Weight Bearing Restrictions  Right Lower Extremity Weight Bearing: Weight Bearing As Tolerated     Subjective   General  Diagnosis: R TKR  Follows Commands: Within Functional

## 2024-05-13 ENCOUNTER — TELEPHONE (OUTPATIENT)
Dept: INPATIENT UNIT | Age: 69
End: 2024-05-13

## 2024-10-02 ENCOUNTER — TELEPHONE (OUTPATIENT)
Age: 69
End: 2024-10-02

## 2024-10-02 NOTE — TELEPHONE ENCOUNTER
Patient is calling regarding the following: Wants to speak to the nurse directly and about the time for surgery 10-17

## 2024-10-02 NOTE — TELEPHONE ENCOUNTER
I returned patient's call and answered his questions about surgery date and time, and about his medication refills.

## 2024-10-08 ENCOUNTER — TELEPHONE (OUTPATIENT)
Age: 69
End: 2024-10-08

## 2024-10-08 NOTE — TELEPHONE ENCOUNTER
----- Message from Adali FARR sent at 10/8/2024 12:15 PM CDT -----  Regarding: Return call from Kitty - Dr. De Souza  Patient requests a call back from Kitty please, he needs to discuss a time as he has to book transportation 72 hours ahead of time.    Thank you

## 2024-10-10 NOTE — DISCHARGE INSTR - DIET
Good nutrition is important when healing from an illness, injury, or surgery.  Follow any nutrition recommendations given to you during your hospital stay.   If you were given an oral nutrition supplement while in the hospital, continue to take this supplement at home.  You can take it with meals, in-between meals, and/or before bedtime. These supplements can be purchased at most local grocery stores, pharmacies, and chain [employer]-stores.   If you have any questions about your diet or nutrition, call the hospital and ask for the dietitian.             Resume home diet

## 2024-10-10 NOTE — DISCHARGE INSTRUCTIONS
[facility] [place] [facility]  Dr. Pj Tran      Total Knee and Uni Knee Replacement  Discharge Instructions     To prevent blood clots, you have been placed on the following medication:  Aspirin 81 mg twice a day for four weeks    Surgical Site Care:  Showering is permitted on post op day 2 - Saturday  No submersion in a bath, swimming pool, whirlpool, etc    Physical Therapy:  You were given a prescription for outpatient therapy.  Please schedule with [facility]  if convenient by calling 1 [phone]. Otherwise, schedule with a therapist closer to your home  Work on range of motion.    Goal for your first office visit is for you to fully straighten and bend your knee to at least a right angle   Don't walk for exercise (it will make you more sore)  Weight Bearing Status:  Full weight bearing with a walker     Pain Medications  You were given a prescription to fill at your pharmacy  Wean off pain medications as you deem appropriate as long as pain is under control  Take tylenol instead of the prescribed pain medicine as your pain improves    Cold packs                                                                                                               FEVER .5 or less        May be used as necessary                                                                    Take Tylenol x 2                                                                                                            Deep breath x 10   Please take a stool softener such as dulcolax or colace daily                                 Cough, cough, cough                                                                                                                        Recheck in 1 - 11/2                                                                                                                                            hours  Do not drive until surgeon releases you  DO NOT SMOKE, VAPE OR CHEW!!!    Contact office

## 2024-10-17 ENCOUNTER — ANESTHESIA EVENT (OUTPATIENT)
Dept: OPERATING ROOM | Age: 69
End: 2024-10-17
Payer: MEDICARE

## 2024-10-17 ENCOUNTER — HOSPITAL ENCOUNTER (OUTPATIENT)
Age: 69
Setting detail: OBSERVATION
Discharge: HOME | End: 2024-10-18
Attending: ORTHOPAEDIC SURGERY | Admitting: ORTHOPAEDIC SURGERY
Payer: MEDICARE

## 2024-10-17 ENCOUNTER — ANESTHESIA (OUTPATIENT)
Dept: OPERATING ROOM | Age: 69
End: 2024-10-17
Payer: MEDICARE

## 2024-10-17 DIAGNOSIS — M17.12 PRIMARY OSTEOARTHRITIS OF LEFT KNEE: Primary | ICD-10-CM

## 2024-10-17 PROCEDURE — 2580000003 HC RX 258: Performed by: ORTHOPAEDIC SURGERY

## 2024-10-17 PROCEDURE — 2580000003 HC RX 258: Performed by: ANESTHESIOLOGY

## 2024-10-17 PROCEDURE — P9045 ALBUMIN (HUMAN), 5%, 250 ML: HCPCS | Performed by: NURSE ANESTHETIST, CERTIFIED REGISTERED

## 2024-10-17 PROCEDURE — G0378 HOSPITAL OBSERVATION PER HR: HCPCS

## 2024-10-17 PROCEDURE — 3700000001 HC ADD 15 MINUTES (ANESTHESIA): Performed by: ORTHOPAEDIC SURGERY

## 2024-10-17 PROCEDURE — 3600000005 HC SURGERY LEVEL 5 BASE: Performed by: ORTHOPAEDIC SURGERY

## 2024-10-17 PROCEDURE — 2500000003 HC RX 250 WO HCPCS: Performed by: NURSE ANESTHETIST, CERTIFIED REGISTERED

## 2024-10-17 PROCEDURE — 6360000002 HC RX W HCPCS: Performed by: ORTHOPAEDIC SURGERY

## 2024-10-17 PROCEDURE — 27447 TOTAL KNEE ARTHROPLASTY: CPT | Performed by: ORTHOPAEDIC SURGERY

## 2024-10-17 PROCEDURE — 2709999900 HC NON-CHARGEABLE SUPPLY: Performed by: ORTHOPAEDIC SURGERY

## 2024-10-17 PROCEDURE — 6360000002 HC RX W HCPCS: Performed by: NURSE ANESTHETIST, CERTIFIED REGISTERED

## 2024-10-17 PROCEDURE — 2500000003 HC RX 250 WO HCPCS: Performed by: ORTHOPAEDIC SURGERY

## 2024-10-17 PROCEDURE — 94760 N-INVAS EAR/PLS OXIMETRY 1: CPT

## 2024-10-17 PROCEDURE — 2720000010 HC SURG SUPPLY STERILE: Performed by: ORTHOPAEDIC SURGERY

## 2024-10-17 PROCEDURE — 6370000000 HC RX 637 (ALT 250 FOR IP): Performed by: ORTHOPAEDIC SURGERY

## 2024-10-17 PROCEDURE — 3700000000 HC ANESTHESIA ATTENDED CARE: Performed by: ORTHOPAEDIC SURGERY

## 2024-10-17 PROCEDURE — 3600000015 HC SURGERY LEVEL 5 ADDTL 15MIN: Performed by: ORTHOPAEDIC SURGERY

## 2024-10-17 PROCEDURE — A4217 STERILE WATER/SALINE, 500 ML: HCPCS | Performed by: ORTHOPAEDIC SURGERY

## 2024-10-17 PROCEDURE — 94150 VITAL CAPACITY TEST: CPT

## 2024-10-17 PROCEDURE — 7100000001 HC PACU RECOVERY - ADDTL 15 MIN: Performed by: ORTHOPAEDIC SURGERY

## 2024-10-17 PROCEDURE — C1713 ANCHOR/SCREW BN/BN,TIS/BN: HCPCS | Performed by: ORTHOPAEDIC SURGERY

## 2024-10-17 PROCEDURE — 20985 CPTR-ASST DIR MS PX: CPT | Performed by: ORTHOPAEDIC SURGERY

## 2024-10-17 PROCEDURE — 7100000000 HC PACU RECOVERY - FIRST 15 MIN: Performed by: ORTHOPAEDIC SURGERY

## 2024-10-17 PROCEDURE — C1776 JOINT DEVICE (IMPLANTABLE): HCPCS | Performed by: ORTHOPAEDIC SURGERY

## 2024-10-17 PROCEDURE — 6360000002 HC RX W HCPCS: Performed by: ANESTHESIOLOGY

## 2024-10-17 DEVICE — COMPONENT PAT DIA38MM THK9.5MM KNEE POLY CEM CONVENTIONAL: Type: IMPLANTABLE DEVICE | Site: KNEE | Status: FUNCTIONAL

## 2024-10-17 DEVICE — PSN TIB STM 5 DEG SZ F L: Type: IMPLANTABLE DEVICE | Site: KNEE | Status: FUNCTIONAL

## 2024-10-17 DEVICE — IMPLANTABLE DEVICE
Type: IMPLANTABLE DEVICE | Site: KNEE | Status: FUNCTIONAL
Brand: PERSONA®

## 2024-10-17 DEVICE — CEMENT BONE 40 GM W/ GENTMYCN HI VISC PALACOS R+G: Type: IMPLANTABLE DEVICE | Site: KNEE | Status: FUNCTIONAL

## 2024-10-17 DEVICE — IMPLANTABLE DEVICE: Type: IMPLANTABLE DEVICE | Site: KNEE | Status: FUNCTIONAL

## 2024-10-17 DEVICE — IMPLANTABLE DEVICE
Type: IMPLANTABLE DEVICE | Site: KNEE | Status: FUNCTIONAL
Brand: PERSONA® VIVACIT-E®

## 2024-10-17 RX ORDER — HYDROMORPHONE HYDROCHLORIDE 1 MG/ML
0.5 INJECTION, SOLUTION INTRAMUSCULAR; INTRAVENOUS; SUBCUTANEOUS EVERY 5 MIN PRN
Status: DISCONTINUED | OUTPATIENT
Start: 2024-10-17 | End: 2024-10-17 | Stop reason: HOSPADM

## 2024-10-17 RX ORDER — CELECOXIB 200 MG/1
200 CAPSULE ORAL ONCE
Status: COMPLETED | OUTPATIENT
Start: 2024-10-17 | End: 2024-10-17

## 2024-10-17 RX ORDER — ROPIVACAINE HYDROCHLORIDE 5 MG/ML
30 INJECTION, SOLUTION EPIDURAL; INFILTRATION; PERINEURAL ONCE
Status: DISCONTINUED | OUTPATIENT
Start: 2024-10-17 | End: 2024-10-17

## 2024-10-17 RX ORDER — METOCLOPRAMIDE HYDROCHLORIDE 5 MG/ML
10 INJECTION INTRAMUSCULAR; INTRAVENOUS
Status: DISCONTINUED | OUTPATIENT
Start: 2024-10-17 | End: 2024-10-17 | Stop reason: HOSPADM

## 2024-10-17 RX ORDER — FAMOTIDINE 20 MG/1
20 TABLET, FILM COATED ORAL ONCE
Status: DISCONTINUED | OUTPATIENT
Start: 2024-10-17 | End: 2024-10-17 | Stop reason: HOSPADM

## 2024-10-17 RX ORDER — PROPOFOL 10 MG/ML
INJECTION, EMULSION INTRAVENOUS
Status: DISCONTINUED | OUTPATIENT
Start: 2024-10-17 | End: 2024-10-17 | Stop reason: SDUPTHER

## 2024-10-17 RX ORDER — SODIUM CHLORIDE 9 MG/ML
INJECTION, SOLUTION INTRAVENOUS PRN
Status: DISCONTINUED | OUTPATIENT
Start: 2024-10-17 | End: 2024-10-18 | Stop reason: HOSPADM

## 2024-10-17 RX ORDER — EPHEDRINE SULFATE/0.9% NACL/PF 25 MG/5 ML
SYRINGE (ML) INTRAVENOUS
Status: DISCONTINUED | OUTPATIENT
Start: 2024-10-17 | End: 2024-10-17 | Stop reason: SDUPTHER

## 2024-10-17 RX ORDER — BUPIVACAINE HYDROCHLORIDE 7.5 MG/ML
INJECTION, SOLUTION INTRASPINAL
Status: DISCONTINUED | OUTPATIENT
Start: 2024-10-17 | End: 2024-10-17 | Stop reason: SDUPTHER

## 2024-10-17 RX ORDER — MIDAZOLAM HYDROCHLORIDE 2 MG/2ML
2 INJECTION, SOLUTION INTRAMUSCULAR; INTRAVENOUS
Status: DISCONTINUED | OUTPATIENT
Start: 2024-10-17 | End: 2024-10-17 | Stop reason: HOSPADM

## 2024-10-17 RX ORDER — MORPHINE SULFATE 4 MG/ML
4 INJECTION, SOLUTION INTRAMUSCULAR; INTRAVENOUS
Status: DISCONTINUED | OUTPATIENT
Start: 2024-10-17 | End: 2024-10-18 | Stop reason: HOSPADM

## 2024-10-17 RX ORDER — SODIUM CHLORIDE 0.9 % (FLUSH) 0.9 %
5-40 SYRINGE (ML) INJECTION EVERY 12 HOURS SCHEDULED
Status: DISCONTINUED | OUTPATIENT
Start: 2024-10-17 | End: 2024-10-17 | Stop reason: HOSPADM

## 2024-10-17 RX ORDER — SCOLOPAMINE TRANSDERMAL SYSTEM 1 MG/1
1 PATCH, EXTENDED RELEASE TRANSDERMAL ONCE
Status: DISCONTINUED | OUTPATIENT
Start: 2024-10-17 | End: 2024-10-17

## 2024-10-17 RX ORDER — ACETAMINOPHEN 500 MG
1000 TABLET ORAL ONCE
Status: COMPLETED | OUTPATIENT
Start: 2024-10-17 | End: 2024-10-17

## 2024-10-17 RX ORDER — LIDOCAINE HYDROCHLORIDE 10 MG/ML
INJECTION, SOLUTION INFILTRATION; PERINEURAL
Status: DISCONTINUED | OUTPATIENT
Start: 2024-10-17 | End: 2024-10-17 | Stop reason: SDUPTHER

## 2024-10-17 RX ORDER — ONDANSETRON 2 MG/ML
INJECTION INTRAMUSCULAR; INTRAVENOUS
Status: DISCONTINUED | OUTPATIENT
Start: 2024-10-17 | End: 2024-10-17 | Stop reason: SDUPTHER

## 2024-10-17 RX ORDER — TRANEXAMIC ACID 650 MG/1
1950 TABLET ORAL ONCE
Status: COMPLETED | OUTPATIENT
Start: 2024-10-17 | End: 2024-10-17

## 2024-10-17 RX ORDER — SODIUM CHLORIDE 0.9 % (FLUSH) 0.9 %
5-40 SYRINGE (ML) INJECTION PRN
Status: DISCONTINUED | OUTPATIENT
Start: 2024-10-17 | End: 2024-10-17 | Stop reason: HOSPADM

## 2024-10-17 RX ORDER — OXYCODONE HCL 10 MG/1
10 TABLET, FILM COATED, EXTENDED RELEASE ORAL ONCE
Status: COMPLETED | OUTPATIENT
Start: 2024-10-17 | End: 2024-10-17

## 2024-10-17 RX ORDER — DEXAMETHASONE SODIUM PHOSPHATE 10 MG/ML
10 INJECTION, SOLUTION INTRAMUSCULAR; INTRAVENOUS ONCE
Status: COMPLETED | OUTPATIENT
Start: 2024-10-17 | End: 2024-10-17

## 2024-10-17 RX ORDER — MIDAZOLAM HYDROCHLORIDE 2 MG/2ML
2 INJECTION, SOLUTION INTRAMUSCULAR; INTRAVENOUS ONCE
Status: COMPLETED | OUTPATIENT
Start: 2024-10-17 | End: 2024-10-17

## 2024-10-17 RX ORDER — SODIUM CHLORIDE 9 MG/ML
INJECTION, SOLUTION INTRAVENOUS PRN
Status: DISCONTINUED | OUTPATIENT
Start: 2024-10-17 | End: 2024-10-17 | Stop reason: HOSPADM

## 2024-10-17 RX ORDER — LIDOCAINE HYDROCHLORIDE 10 MG/ML
1 INJECTION, SOLUTION EPIDURAL; INFILTRATION; INTRACAUDAL; PERINEURAL
Status: DISCONTINUED | OUTPATIENT
Start: 2024-10-17 | End: 2024-10-17 | Stop reason: HOSPADM

## 2024-10-17 RX ORDER — ACETAMINOPHEN 325 MG/1
650 TABLET ORAL EVERY 6 HOURS
Status: DISCONTINUED | OUTPATIENT
Start: 2024-10-17 | End: 2024-10-18 | Stop reason: HOSPADM

## 2024-10-17 RX ORDER — SODIUM CHLORIDE 0.9 % (FLUSH) 0.9 %
5-40 SYRINGE (ML) INJECTION PRN
Status: DISCONTINUED | OUTPATIENT
Start: 2024-10-17 | End: 2024-10-18 | Stop reason: HOSPADM

## 2024-10-17 RX ORDER — ASPIRIN 325 MG
325 TABLET, DELAYED RELEASE (ENTERIC COATED) ORAL 2 TIMES DAILY
Status: DISCONTINUED | OUTPATIENT
Start: 2024-10-17 | End: 2024-10-18 | Stop reason: HOSPADM

## 2024-10-17 RX ORDER — FENTANYL CITRATE 50 UG/ML
INJECTION, SOLUTION INTRAMUSCULAR; INTRAVENOUS
Status: DISCONTINUED | OUTPATIENT
Start: 2024-10-17 | End: 2024-10-17 | Stop reason: SDUPTHER

## 2024-10-17 RX ORDER — HYDROMORPHONE HYDROCHLORIDE 1 MG/ML
0.25 INJECTION, SOLUTION INTRAMUSCULAR; INTRAVENOUS; SUBCUTANEOUS EVERY 5 MIN PRN
Status: DISCONTINUED | OUTPATIENT
Start: 2024-10-17 | End: 2024-10-17 | Stop reason: HOSPADM

## 2024-10-17 RX ORDER — ALBUMIN, HUMAN INJ 5% 5 %
SOLUTION INTRAVENOUS
Status: DISCONTINUED | OUTPATIENT
Start: 2024-10-17 | End: 2024-10-17 | Stop reason: SDUPTHER

## 2024-10-17 RX ORDER — OXYCODONE HYDROCHLORIDE 5 MG/1
5 TABLET ORAL EVERY 4 HOURS PRN
Status: DISCONTINUED | OUTPATIENT
Start: 2024-10-17 | End: 2024-10-18 | Stop reason: HOSPADM

## 2024-10-17 RX ORDER — SODIUM CHLORIDE, SODIUM LACTATE, POTASSIUM CHLORIDE, CALCIUM CHLORIDE 600; 310; 30; 20 MG/100ML; MG/100ML; MG/100ML; MG/100ML
INJECTION, SOLUTION INTRAVENOUS CONTINUOUS
Status: DISCONTINUED | OUTPATIENT
Start: 2024-10-17 | End: 2024-10-17 | Stop reason: HOSPADM

## 2024-10-17 RX ORDER — OXYCODONE HYDROCHLORIDE 5 MG/1
10 TABLET ORAL EVERY 4 HOURS PRN
Status: DISCONTINUED | OUTPATIENT
Start: 2024-10-17 | End: 2024-10-18 | Stop reason: HOSPADM

## 2024-10-17 RX ORDER — DIPHENHYDRAMINE HYDROCHLORIDE 50 MG/ML
12.5 INJECTION INTRAMUSCULAR; INTRAVENOUS
Status: DISCONTINUED | OUTPATIENT
Start: 2024-10-17 | End: 2024-10-17 | Stop reason: HOSPADM

## 2024-10-17 RX ORDER — NALOXONE HYDROCHLORIDE 0.4 MG/ML
INJECTION, SOLUTION INTRAMUSCULAR; INTRAVENOUS; SUBCUTANEOUS PRN
Status: DISCONTINUED | OUTPATIENT
Start: 2024-10-17 | End: 2024-10-17 | Stop reason: HOSPADM

## 2024-10-17 RX ORDER — MORPHINE SULFATE 4 MG/ML
2 INJECTION, SOLUTION INTRAMUSCULAR; INTRAVENOUS
Status: DISCONTINUED | OUTPATIENT
Start: 2024-10-17 | End: 2024-10-18 | Stop reason: HOSPADM

## 2024-10-17 RX ORDER — MELOXICAM 7.5 MG/1
3.75 TABLET ORAL DAILY
Status: DISCONTINUED | OUTPATIENT
Start: 2024-10-18 | End: 2024-10-18 | Stop reason: HOSPADM

## 2024-10-17 RX ORDER — SODIUM CHLORIDE 0.9 % (FLUSH) 0.9 %
5-40 SYRINGE (ML) INJECTION EVERY 12 HOURS SCHEDULED
Status: DISCONTINUED | OUTPATIENT
Start: 2024-10-17 | End: 2024-10-18 | Stop reason: HOSPADM

## 2024-10-17 RX ADMIN — SODIUM CHLORIDE, SODIUM LACTATE, POTASSIUM CHLORIDE, AND CALCIUM CHLORIDE: 600; 310; 30; 20 INJECTION, SOLUTION INTRAVENOUS at 10:24

## 2024-10-17 RX ADMIN — ACETAMINOPHEN 650 MG: 325 TABLET ORAL at 21:00

## 2024-10-17 RX ADMIN — OXYCODONE HYDROCHLORIDE 10 MG: 10 TABLET, FILM COATED, EXTENDED RELEASE ORAL at 10:20

## 2024-10-17 RX ADMIN — ASPIRIN 325 MG: 325 TABLET, COATED ORAL at 21:00

## 2024-10-17 RX ADMIN — CELECOXIB 200 MG: 200 CAPSULE ORAL at 10:20

## 2024-10-17 RX ADMIN — ACETAMINOPHEN 1000 MG: 500 TABLET ORAL at 10:20

## 2024-10-17 RX ADMIN — ALBUMIN (HUMAN) 12.5 G: 12.5 INJECTION, SOLUTION INTRAVENOUS at 13:59

## 2024-10-17 RX ADMIN — TRANEXAMIC ACID 1950 MG: 650 TABLET ORAL at 10:20

## 2024-10-17 RX ADMIN — PHENYLEPHRINE HYDROCHLORIDE 200 MCG: 10 INJECTION INTRAVENOUS at 14:25

## 2024-10-17 RX ADMIN — EPHEDRINE SULFATE 10 MG: 5 INJECTION INTRAVENOUS at 14:25

## 2024-10-17 RX ADMIN — PROPOFOL 80 MG: 10 INJECTION, EMULSION INTRAVENOUS at 13:19

## 2024-10-17 RX ADMIN — EPHEDRINE SULFATE 5 MG: 5 INJECTION INTRAVENOUS at 14:12

## 2024-10-17 RX ADMIN — OXYCODONE 10 MG: 5 TABLET ORAL at 22:59

## 2024-10-17 RX ADMIN — FENTANYL CITRATE 50 MCG: 0.05 INJECTION, SOLUTION INTRAMUSCULAR; INTRAVENOUS at 13:06

## 2024-10-17 RX ADMIN — PHENYLEPHRINE HYDROCHLORIDE 100 MCG: 10 INJECTION INTRAVENOUS at 14:15

## 2024-10-17 RX ADMIN — ONDANSETRON 4 MG: 2 INJECTION INTRAMUSCULAR; INTRAVENOUS at 14:49

## 2024-10-17 RX ADMIN — PHENYLEPHRINE HYDROCHLORIDE 200 MCG: 10 INJECTION INTRAVENOUS at 13:50

## 2024-10-17 RX ADMIN — WATER 2000 MG: 1 INJECTION INTRAMUSCULAR; INTRAVENOUS; SUBCUTANEOUS at 20:59

## 2024-10-17 RX ADMIN — CEFAZOLIN 2000 MG: 2 INJECTION, POWDER, FOR SOLUTION INTRAMUSCULAR; INTRAVENOUS at 13:22

## 2024-10-17 RX ADMIN — PHENYLEPHRINE HYDROCHLORIDE 200 MCG: 10 INJECTION INTRAVENOUS at 13:57

## 2024-10-17 RX ADMIN — FENTANYL CITRATE 25 MCG: 0.05 INJECTION, SOLUTION INTRAMUSCULAR; INTRAVENOUS at 13:38

## 2024-10-17 RX ADMIN — FENTANYL CITRATE 25 MCG: 0.05 INJECTION, SOLUTION INTRAMUSCULAR; INTRAVENOUS at 13:25

## 2024-10-17 RX ADMIN — PROPOFOL 100 MCG/KG/MIN: 10 INJECTION, EMULSION INTRAVENOUS at 13:20

## 2024-10-17 RX ADMIN — BUPIVACAINE HYDROCHLORIDE IN DEXTROSE 1.9 MG: 7.5 INJECTION, SOLUTION SUBARACHNOID at 13:16

## 2024-10-17 RX ADMIN — LIDOCAINE HYDROCHLORIDE 50 MG: 10 INJECTION, SOLUTION INFILTRATION; PERINEURAL at 13:19

## 2024-10-17 RX ADMIN — MIDAZOLAM 2 MG: 1 INJECTION INTRAMUSCULAR; INTRAVENOUS at 12:17

## 2024-10-17 RX ADMIN — ACETAMINOPHEN 650 MG: 325 TABLET ORAL at 18:40

## 2024-10-17 RX ADMIN — OXYCODONE 10 MG: 5 TABLET ORAL at 19:21

## 2024-10-17 RX ADMIN — DEXAMETHASONE SODIUM PHOSPHATE 10 MG: 10 INJECTION, SOLUTION INTRAMUSCULAR; INTRAVENOUS at 13:21

## 2024-10-17 RX ADMIN — ALBUMIN (HUMAN) 12.5 G: 12.5 INJECTION, SOLUTION INTRAVENOUS at 13:46

## 2024-10-17 RX ADMIN — PHENYLEPHRINE HYDROCHLORIDE 100 MCG: 10 INJECTION INTRAVENOUS at 13:41

## 2024-10-17 RX ADMIN — EPHEDRINE SULFATE 5 MG: 5 INJECTION INTRAVENOUS at 14:15

## 2024-10-17 SDOH — ECONOMIC STABILITY: INCOME INSECURITY: HOW HARD IS IT FOR YOU TO PAY FOR THE VERY BASICS LIKE FOOD, HOUSING, MEDICAL CARE, AND HEATING?: SOMEWHAT HARD

## 2024-10-17 SDOH — ECONOMIC STABILITY: FOOD INSECURITY: WITHIN THE PAST 12 MONTHS, YOU WORRIED THAT YOUR FOOD WOULD RUN OUT BEFORE YOU GOT MONEY TO BUY MORE.: NEVER TRUE

## 2024-10-17 SDOH — ECONOMIC STABILITY: INCOME INSECURITY: IN THE PAST 12 MONTHS, HAS THE ELECTRIC, GAS, OIL, OR WATER COMPANY THREATENED TO SHUT OFF SERVICE IN YOUR HOME?: NO

## 2024-10-17 ASSESSMENT — PAIN DESCRIPTION - DESCRIPTORS
DESCRIPTORS: ACHING

## 2024-10-17 ASSESSMENT — PAIN SCALES - GENERAL
PAINLEVEL_OUTOF10: 3
PAINLEVEL_OUTOF10: 3

## 2024-10-17 ASSESSMENT — PATIENT HEALTH QUESTIONNAIRE - PHQ9
SUM OF ALL RESPONSES TO PHQ9 QUESTIONS 1 & 2: 0
SUM OF ALL RESPONSES TO PHQ QUESTIONS 1-9: 0
2. FEELING DOWN, DEPRESSED OR HOPELESS: NOT AT ALL
1. LITTLE INTEREST OR PLEASURE IN DOING THINGS: NOT AT ALL

## 2024-10-17 ASSESSMENT — LIFESTYLE VARIABLES: SMOKING_STATUS: 0

## 2024-10-17 NOTE — ACP (ADVANCE CARE PLANNING)
Advance Care Planning     Advance Care Planning Inpatient Note  Spiritual Care Department    Today's Date: 10/17/2024  Unit: [facility] SURG SERVICES    Received request from rounding.  Upon review of chart and communication with care team, patient's decision making abilities are not in question.. Patient was/were present in the room during visit.    Goals of ACP Conversation:  Facilitate a discussion related to patient's goals of care as they align with the patient's values and beliefs.    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  His parent and relatives where like that said the patient.He wanted to get well and join with all his pet and be at home.        Advance Care Planning Documents (Patient Wishes):  None     Assessment:  The patient had some pain and he fond of knowing many things.  He did not show any stress or worriy, However he want ob with his friends and pets.    Interventions:  Encouraged ongoing ACP conversation with future decision makers and loved ones    Care Preferences Communicated:   No    Outcomes/Plan:  The patient have his relatives in his decision making files.    Electronically signed by Jason Felipe on 10/17/2024 at 6:42 PM

## 2024-10-17 NOTE — ANESTHESIA POSTPROCEDURE EVALUATION
Department of Anesthesiology  Postprocedure Note    Patient: Grant Loaiza  MRN: [id number]  YOB: 1955  Date of evaluation: 10/17/2024    Procedure Summary       Date: 10/17/24 Room / Location: [address]    Anesthesia Start: 1301 Anesthesia Stop: 1517    Procedure: ROBOTIC LEFT TOTAL KNEE ARTHROPLASTY (Left: Knee) Diagnosis:       Bilateral primary osteoarthritis of knee      (Bilateral primary osteoarthritis of knee [M17.0])    Surgeons: Isaac De Souza MD Responsible Provider: Denisha Nunez APRN - CRNA    Anesthesia Type: regional, spinal ASA Status: 2            Anesthesia Type: No value filed.    Guru Phase I: Guru Score: 10    Guru Phase II:      Anesthesia Post Evaluation    Patient location during evaluation: PACU  Patient participation: complete - patient participated  Level of consciousness: sleepy but conscious  Pain score: 0  Airway patency: positional obstruction  Nausea & Vomiting: no vomiting and no nausea  Cardiovascular status: blood pressure returned to baseline  Respiratory status: acceptable  Hydration status: stable  Pain management: adequate    No notable events documented.

## 2024-10-17 NOTE — PROGRESS NOTES
Spiritual Health History and Assessment/Progress Note  [facility]    (P) Loneliness/Social Isolation, (P) Emotional distress, (P) Life Adjustments,      Name: Grant Loaiza MRN: [id number]    Age: 69 y.o.     Sex: male   Language: English   Scientologist: Islam of [employer]   <principal problem not specified>     Date: 10/17/2024            Total Time Calculated: (P) 42 min              Spiritual Assessment began in [facility] SURG SERVICES        Referral/Consult From: (P) Multi-disciplinary team   Encounter Overview/Reason: (P) Loneliness/Social Isolation  Service Provided For: (P) Patient    Oma, Belief, Meaning:   Patient identifies as spiritual  Family/Friends Other: The patient was not very sure about his relatives and their spirituality and also he is not fond of any Yazdanism or groups.      Importance and Influence:  Patient has no beliefs influential to healthcare decision-making identified during this visit  Family/Friends have no beliefs influential to healthcare decision-making identified during this visit    Community:  Patient feels well-supported. Support system includes: Friends and Extended family  Family/Friends expresses feelings of isolation: feeling there is no one to turn to for help    Assessment and Plan of Care:     Patient Interventions include: Facilitated expression of thoughts and feelings  Family/Friends Interventions include: Affirmed coping skills/support systems    Patient Plan of Care: No spiritual needs identified for follow-up  Family/Friends Plan of Care: No future visits per patient/family request    Electronically signed by Jason Felipe on 10/17/2024 at 6:36 PM          10/17/24 1830   Encounter Summary   Encounter Overview/Reason Loneliness/Social Isolation   Encounter Code  Assessment by  services   Service Provided For Patient   Referral/Consult From Multi-disciplinary team   Support System Family members   Complexity of Encounter Moderate

## 2024-10-17 NOTE — ANESTHESIA PROCEDURE NOTES
Spinal Block    Patient location during procedure: OR  End time: 10/17/2024 1:16 PM  Reason for block: procedure for pain, post-op pain management and primary anesthetic  Staffing  Performed: resident/CRNA   Resident/CRNA: Denisha Nunez APRN - CRNA  Performed by: Denisha Nunez APRN - CRNA  Authorized by: Denisha Nunez APRN - CRNA    Spinal Block  Patient position: sitting  Prep: Betadine  Patient monitoring: continuous pulse ox and frequent blood pressure checks  Approach: midline  Location: L2/L3  Provider prep: mask and sterile gloves  Local infiltration: lidocaine  Needle  Needle type: Pencan   Needle gauge: 24 G  Needle length: 4 in  Needle insertion depth: 7.6 cm  Kit: [id number]  Expiration date: 5/31/2026  Assessment  Sensory level: T10  Swirl obtained: Yes  CSF: clear  Attempts: 2  Hemodynamics: stable  Preanesthetic Checklist  Completed: patient identified, IV checked, site marked, risks and benefits discussed, surgical/procedural consents, equipment checked, pre-op evaluation, timeout performed, anesthesia consent given, oxygen available, monitors applied/VS acknowledged, fire risk safety assessment completed and verbalized and blood product R/B/A discussed and consented

## 2024-10-17 NOTE — ANESTHESIA PRE PROCEDURE
Department of Anesthesiology  Preprocedure Note       Name:  Grant Loaiza   Age:  69 y.o.  :  1955                                          MRN:  [id number]         Date:  10/17/2024      Surgeon: Surgeon(s):  Isaac De Souza MD    Procedure: Procedure(s):  ROBOTIC LEFT TOTAL KNEE ARTHROPLASTY    Medications prior to admission:   Prior to Admission medications    Medication Sig Start Date End Date Taking? Authorizing Provider   aspirin (ASPIRIN CHILDRENS) 81 MG chewable tablet Take 1 tablet by mouth in the morning and at bedtime  Patient taking differently: Take 1 tablet by mouth daily 24  Yes Isaac De Souza MD   Multiple Vitamins-Minerals (CENTRUM SILVER ADULT 50+) TABS Take 1 tablet by mouth daily   Yes Martha Ugarte MD   levothyroxine (SYNTHROID) 25 MCG tablet Take 1 tablet by mouth Daily   Yes Martha Ugarte MD   meloxicam (MOBIC) 7.5 MG tablet Take 1 tablet by mouth daily   Yes Martha Ugarte MD   docusate sodium (COLACE) 100 MG capsule Take 2 capsules by mouth at bedtime    Martha Ugarte MD   psyllium (KONSYL) 28.3 % PACK Take 1 packet by mouth daily    Martha Ugarte MD   albuterol sulfate HFA (VENTOLIN HFA) 108 (90 Base) MCG/ACT inhaler Inhale 2 puffs into the lungs every 6 hours as needed for Wheezing    Martha Ugarte MD   ibuprofen (ADVIL;MOTRIN) 400 MG tablet Take 1 tablet by mouth every 8 hours as needed for Pain 24   Isaac De Souza MD       Current medications:    Current Facility-Administered Medications   Medication Dose Route Frequency Provider Last Rate Last Admin    lactated ringers IV soln infusion   IntraVENous Continuous Katelin Ceja  mL/hr at 10/17/24 1024 New Bag at 10/17/24 1024    dexAMETHasone (PF) (DECADRON) injection 10 mg  10 mg IntraVENous Once Isaac De Souza MD        scopolamine (TRANSDERM-SCOP) transdermal patch 1 patch  1 patch TransDERmal Once Isaac De Souza MD  Spoke with pt's mom. Dr Sousa ordered a STAT CT SCAN. I advised pt to head to registration at [facility]. I didn't not call to schedule appt for pt. Pt moms states they were sent to the ED. Pt is currently in the ED for further work up

## 2024-10-17 NOTE — OP NOTE
TOTAL KNEE ARTHROPLASTY OPERATIVE NOTE    NAME OF SURGEON / : Pj Tran MD  PATIENT:   Grant Loaiza  Date: 10/17/2024        Time: 2:55 PM   Referring Physician: ________________________    PREOP DIAGNOSIS:  left Knee  Primary osteoarthritis   POSTOP DIAGNOSIS:  Same     PROCEDURE:    Left  Cemented Posterior Stabilized Knee arthroplasty, SHRUTHI robot assisted    IMPLANTS:   Implant Name Type Inv. Item Serial No.  Lot No. LRB No. Used Action   CEMENT BONE 40 GM W/ GENTMYCN HI VISC PALACOS R+G - [id number]  CEMENT BONE 40 GM W/ GENTMYCN HI VISC PALACOS R+G  [facility] [facility] [id number] Left 3 Implanted   COMPONENT PAT [id number] THK9.5MM KNEE POLY EBRE CONVENTIONAL - [id number]  COMPONENT PAT [id number] THK9.5MM KNEE POLY BERE CONVENTIONAL  [employer]- [id number] Left 1 Implanted   COMPONENT FEM BERE 11 STD LT KNEE REV COCR PERSONA - [id number]  COMPONENT FEM BERE 11 STD LT KNEE REV COCR PERSONA  [employer]- [id number] Left 1 Implanted   PSN TIB STM 5 DEG SZ F L - [id number]  PSN TIB STM 5 DEG SZ F L  [employer]- [id number] Left 1 Implanted   EXTENSION STEM L30MM [id number] KNEE TAPR BERE PERSONA - [id number]  EXTENSION STEM L30MM [id number] KNEE TAPR BERE PERSONA  [employer]- [id number] Left 1 Implanted   EXTENSION STEM L30MM [id number] KNEE TAPR BERE PERSONA - [id number]  EXTENSION STEM L30MM [id number] KNEE TAPR BERE PERSONA  [employer]- [id number] Left 1 Implanted   PSN REV CCK ART SURF VE 16MM L 11-13 EF - [id number]  PSN REV CCK ART SURF VE 16MM L 11-13 EF  [employer]- [id number] Left 1 Implanted       FINDINGS:  Preop ROM:  Full except for   - 5 degrees  extension  Alignment:    neutral  Bone quality:  soft  Cartilage wear:  Medial - severe  Lateral - severe  Pat-fem -severe  ACL -Absent    Deviation from robotic plan:  used traditional femoral sizing technique    ASSISTANT: Miguel Burns, certified first assistant.

## 2024-10-17 NOTE — H&P
[facility] Pre-Operative History and Physical    Patient Name: Shannan  : 1955        Chief Complaint: Left knee pain  History of Present Illness:   This patient has had ongoing pain for several weeks/months that has been unresponsive to conservative care which has included injection, therapy, activity modification and presents now for surgery.    Past Medical History:       Diagnosis Date    Anxiety     Arthritis     knee pain    COVID-19     loss taste/smell, mild fever, mild congestion    Nocturia     Thyroid disease      Past Surgical History:       Procedure Laterality Date    APPENDECTOMY      CATARACT EXTRACTION EXTRACAPSULAR W/ INTRAOCULAR LENS IMPLANTATION Bilateral     pt states multiple eye surgeries    COLONOSCOPY      CYST REMOVAL      right hand    EAR SURGERY Left     reattachment    LEG SURGERY Left     tendon reattached    MANDIBLE FRACTURE SURGERY      SKIN BIOPSY      TONSILLECTOMY      TOTAL KNEE ARTHROPLASTY Right 2024    ROBOTIC RIGHT TOTAL KNEE ARTHROPLASTY performed by Isaac De Souza MD at [facility] OR       Medications:   Prior to Admission medications    Medication Sig Start Date End Date Taking? Authorizing Provider   aspirin (ASPIRIN CHILDRENS) 81 MG chewable tablet Take 1 tablet by mouth in the morning and at bedtime  Patient taking differently: Take 1 tablet by mouth daily 24  Yes Isaac De Souza MD   Multiple Vitamins-Minerals (CENTRUM SILVER ADULT 50+) TABS Take 1 tablet by mouth daily   Yes Martha Ugarte MD   levothyroxine (SYNTHROID) 25 MCG tablet Take 1 tablet by mouth Daily   Yes Martha Ugarte MD   meloxicam (MOBIC) 7.5 MG tablet Take 1 tablet by mouth daily   Yes Martha Ugarte MD   docusate sodium (COLACE) 100 MG capsule Take 2 capsules by mouth at bedtime    Martha Ugarte MD   psyllium (KONSYL) 28.3 % PACK Take 1 packet by mouth daily    Martha Ugarte MD   albuterol sulfate HFA (VENTOLIN HFA) 108

## 2024-10-18 VITALS
HEART RATE: 69 BPM | RESPIRATION RATE: 16 BRPM | TEMPERATURE: 97.5 F | BODY MASS INDEX: 25.06 KG/M2 | SYSTOLIC BLOOD PRESSURE: 123 MMHG | WEIGHT: 179 LBS | OXYGEN SATURATION: 99 % | HEIGHT: 71 IN | DIASTOLIC BLOOD PRESSURE: 69 MMHG

## 2024-10-18 PROBLEM — M17.12 PRIMARY OSTEOARTHRITIS OF LEFT KNEE: Status: ACTIVE | Noted: 2024-10-18

## 2024-10-18 LAB
ANION GAP SERPL CALCULATED.3IONS-SCNC: 9 MMOL/L (ref 7–19)
BUN SERPL-MCNC: 23 MG/DL (ref 8–23)
CALCIUM SERPL-MCNC: 8.3 MG/DL (ref 8.8–10.2)
CHLORIDE SERPL-SCNC: 104 MMOL/L (ref 98–111)
CO2 SERPL-SCNC: 27 MMOL/L (ref 22–29)
CREAT SERPL-MCNC: 0.9 MG/DL (ref 0.7–1.2)
GLUCOSE SERPL-MCNC: 136 MG/DL (ref 70–99)
HCT VFR BLD AUTO: 37.1 % (ref 42–52)
HGB BLD-MCNC: 11.8 G/DL (ref 14–18)
POTASSIUM SERPL-SCNC: 4.5 MMOL/L (ref 3.5–5)
SODIUM SERPL-SCNC: 140 MMOL/L (ref 136–145)

## 2024-10-18 PROCEDURE — 2580000003 HC RX 258: Performed by: ORTHOPAEDIC SURGERY

## 2024-10-18 PROCEDURE — 80048 BASIC METABOLIC PNL TOTAL CA: CPT

## 2024-10-18 PROCEDURE — 6370000000 HC RX 637 (ALT 250 FOR IP): Performed by: ORTHOPAEDIC SURGERY

## 2024-10-18 PROCEDURE — 97161 PT EVAL LOW COMPLEX 20 MIN: CPT

## 2024-10-18 PROCEDURE — G0378 HOSPITAL OBSERVATION PER HR: HCPCS

## 2024-10-18 PROCEDURE — 6360000002 HC RX W HCPCS: Performed by: ORTHOPAEDIC SURGERY

## 2024-10-18 PROCEDURE — 6370000000 HC RX 637 (ALT 250 FOR IP): Performed by: STUDENT IN AN ORGANIZED HEALTH CARE EDUCATION/TRAINING PROGRAM

## 2024-10-18 PROCEDURE — 85018 HEMOGLOBIN: CPT

## 2024-10-18 PROCEDURE — 85014 HEMATOCRIT: CPT

## 2024-10-18 PROCEDURE — 36415 COLL VENOUS BLD VENIPUNCTURE: CPT

## 2024-10-18 PROCEDURE — 96374 THER/PROPH/DIAG INJ IV PUSH: CPT

## 2024-10-18 PROCEDURE — 97116 GAIT TRAINING THERAPY: CPT

## 2024-10-18 RX ORDER — OXYCODONE HYDROCHLORIDE 5 MG/1
5 TABLET ORAL EVERY 4 HOURS PRN
Qty: 40 TABLET | Refills: 0 | Status: SHIPPED | OUTPATIENT
Start: 2024-10-18 | End: 2024-10-28

## 2024-10-18 RX ORDER — DOCUSATE SODIUM 100 MG/1
200 CAPSULE, LIQUID FILLED ORAL NIGHTLY
Status: DISCONTINUED | OUTPATIENT
Start: 2024-10-18 | End: 2024-10-18 | Stop reason: HOSPADM

## 2024-10-18 RX ORDER — POLYETHYLENE GLYCOL 3350 17 G/17G
17 POWDER, FOR SOLUTION ORAL 2 TIMES DAILY
Status: DISCONTINUED | OUTPATIENT
Start: 2024-10-18 | End: 2024-10-18 | Stop reason: HOSPADM

## 2024-10-18 RX ORDER — ASPIRIN 81 MG/1
81 TABLET, CHEWABLE ORAL 2 TIMES DAILY
Qty: 60 TABLET | Refills: 0 | Status: SHIPPED | OUTPATIENT
Start: 2024-10-18

## 2024-10-18 RX ORDER — ALBUTEROL SULFATE 90 UG/1
2 INHALANT RESPIRATORY (INHALATION) EVERY 6 HOURS PRN
Status: DISCONTINUED | OUTPATIENT
Start: 2024-10-18 | End: 2024-10-18 | Stop reason: HOSPADM

## 2024-10-18 RX ORDER — LEVOTHYROXINE SODIUM 25 UG/1
25 TABLET ORAL DAILY
Status: DISCONTINUED | OUTPATIENT
Start: 2024-10-18 | End: 2024-10-18 | Stop reason: HOSPADM

## 2024-10-18 RX ORDER — DOXYCYCLINE HYCLATE 100 MG
100 TABLET ORAL 2 TIMES DAILY
Qty: 14 TABLET | Refills: 0 | Status: SHIPPED | OUTPATIENT
Start: 2024-10-18 | End: 2024-10-25

## 2024-10-18 RX ADMIN — OXYCODONE 10 MG: 5 TABLET ORAL at 13:27

## 2024-10-18 RX ADMIN — ASPIRIN 325 MG: 325 TABLET, COATED ORAL at 08:25

## 2024-10-18 RX ADMIN — WATER 2000 MG: 1 INJECTION INTRAMUSCULAR; INTRAVENOUS; SUBCUTANEOUS at 05:51

## 2024-10-18 RX ADMIN — ACETAMINOPHEN 650 MG: 325 TABLET ORAL at 04:06

## 2024-10-18 RX ADMIN — MELOXICAM 3.75 MG: 7.5 TABLET ORAL at 08:25

## 2024-10-18 RX ADMIN — Medication 10 ML: at 08:33

## 2024-10-18 RX ADMIN — MORPHINE SULFATE 4 MG: 4 INJECTION, SOLUTION INTRAMUSCULAR; INTRAVENOUS at 01:01

## 2024-10-18 RX ADMIN — LEVOTHYROXINE SODIUM 25 MCG: 25 TABLET ORAL at 08:27

## 2024-10-18 RX ADMIN — ACETAMINOPHEN 650 MG: 325 TABLET ORAL at 12:28

## 2024-10-18 RX ADMIN — OXYCODONE 10 MG: 5 TABLET ORAL at 08:23

## 2024-10-18 RX ADMIN — POLYETHYLENE GLYCOL 3350 17 G: 17 POWDER, FOR SOLUTION ORAL at 08:25

## 2024-10-18 ASSESSMENT — PAIN DESCRIPTION - ORIENTATION
ORIENTATION: LEFT

## 2024-10-18 ASSESSMENT — PAIN SCALES - GENERAL
PAINLEVEL_OUTOF10: 10
PAINLEVEL_OUTOF10: 5
PAINLEVEL_OUTOF10: 5

## 2024-10-18 ASSESSMENT — PAIN DESCRIPTION - DESCRIPTORS
DESCRIPTORS: ACHING

## 2024-10-18 ASSESSMENT — PAIN DESCRIPTION - LOCATION
LOCATION: KNEE

## 2024-10-18 NOTE — PLAN OF CARE
Problem: Discharge Planning  Goal: Discharge to home or other facility with appropriate resources  Outcome: Adequate for Discharge  Flowsheets  Taken 10/18/2024 0745 by Courtney Nunez RN  Discharge to home or other facility with appropriate resources:   Identify barriers to discharge with patient and caregiver   Arrange for needed discharge resources and transportation as appropriate   Identify discharge learning needs (meds, wound care, etc)   Arrange for interpreters to assist at discharge as needed   Refer to discharge planning if patient needs post-hospital services based on physician order or complex needs related to functional status, cognitive ability or social support system  Taken 10/17/2024 2141 by Jacqueline Preston LPN  Discharge to home or other facility with appropriate resources: Identify barriers to discharge with patient and caregiver     Problem: Safety - Adult  Goal: Free from fall injury  Outcome: Adequate for Discharge  Flowsheets (Taken 10/18/2024 0749)  Free From Fall Injury:   Instruct family/caregiver on patient safety   Based on caregiver fall risk screen, instruct family/caregiver to ask for assistance with transferring infant if caregiver noted to have fall risk factors     Problem: ABCDS Injury Assessment  Goal: Absence of physical injury  Outcome: Adequate for Discharge  Flowsheets (Taken 10/18/2024 0749)  Absence of Physical Injury: Implement safety measures based on patient assessment

## 2024-10-18 NOTE — PROGRESS NOTES
4 Eyes Skin Assessment     NAME:  Grant Loaiza  YOB: 1955  MEDICAL RECORD NUMBER:  [id number]    The patient is being assessed for  Admission    I agree that at least one RN has performed a thorough Head to Toe Skin Assessment on the patient. ALL assessment sites listed below have been assessed.      Areas assessed by both nurses:    Head, Face, Ears, Shoulders, Back, Chest, Arms, Elbows, Hands, Sacrum. Buttock, Coccyx, Ischium, and Legs. Feet and Heels        Does the Patient have a Wound? No noted wound(s)       Kurtis Prevention initiated by RN: No  Wound Care Orders initiated by RN: No    Pressure Injury (Stage 3,4, Unstageable, DTI, NWPT, and Complex wounds) if present, place Wound referral order by RN under : No    New Ostomies, if present place, Ostomy referral order under : No     Nurse 1 eSignature: Electronically signed by EB SOTO RN on 10/18/24 at 9:50 AM CDT    **SHARE this note so that the co-signing nurse can place an eSignature**    Nurse 2 eSignature: Electronically signed by Catherine Akins RN on 10/18/24 at 9:52 AM CDT

## 2024-10-18 NOTE — CARE COORDINATION
MURCIA Letter given to patient. Reviewed, discussed, answered all questions, and signed by patient. Signed copy placed in patient's chart.     10/18/24 1307   IMM Letter   Observation Status Letter date given: 10/18/24   Observation Status Letter time given: 1035     Electronically signed by Steven Altman RN, BSN on 10/18/2024 at 1:07 PM

## 2024-10-18 NOTE — CARE COORDINATION
Bill Loaiza #[id number] (CSN:[id number]) (:1955 69 y.o. M) (Adm: 10/17/24)  [facility] 5 [id number]  PCP    DEE MUSTAFA  Demographics  CommentAddress   [address] [address]    Home Phone   [phone]    Work Phone       Mobile Phone                Social Security Number       Insurance Information   MEDICARE    Marital Status   Single    Rastafari   [employer]               Insurance Payors as of 10/18/2024    [email or web address] [address]    Plan: [email or web address] PLAN [facility]: [id number] Member: [id number]   Effective from: 2024 Subscriber: BILL LOAIZA Subscriber ID: [id number]   Guarantor: BILL LOAIZA     MEDICARE    Plan: MEDICARE PART B Member: [id number] Effective from: 2021   Subscriber: BILL LOAIZA Subscriber ID: [id number] Guarantor: BILL LOAIZA     Documents Filed to Patient    Power of  Living Will Clinical Unknown Study Attachment Consent Form ABN Waiver After Visit Summary Lab Result Scan Code Status MyChart Status Advance Care Planning    Not on File  Not on File  Not on File  Not on File  Not on File  Filed  Filed  Not on File  FULL [Updated on 10/17/24 1639]  Pending Jump to the Activity      Auth/Cert Information    Open auth/cert linked to hospital account [id number]      Emergency Contacts    None on File  Other Contacts    Name Relation Home Work Mobile   michell loaiza Brother/Sister   [phone]   salomón keller Friend   [phone]     Admission Information    Current Information    Attending Provider Admitting Provider Admission Type Admission Status   Isaac De Souza MD Deweese, Frederick Thane, MD Elective Confirmed Admission          Admission Date/Time Discharge Date Hospital Service Auth/Cert Status   10/17/24  0943  Medical Incomplete          Hospital Area Unit Room/Bed    [facility] 5 SURG SERVICES 530-              Hospital Account    Name Acct ID Class

## 2024-10-18 NOTE — CONSULTS
Referring Provider: Dr. De Souza  Reason for Consultation: Medical [facility]    Patient Care Team:  Dennis Mijares APRN - NP as PCP - General (Nurse Practitioner)    Chief complaint: Medical management for total knee arthroplasty    Subjective .     History of present illness:      69-year-old male with past medical history of hypothyroidism who presents postoperatively from total knee arthroplasty.The patient has a long history of knee pain resistant to conservative measures and after careful consultation as an outpatient with Dr. De Souza, they elected to proceed with total knee arthroplasty.  They underwent the operation on 10/17 and tolerated the procedure well.  They have no new postoperative complaints and are in the expected amount of postoperative pain.      REVIEW OF SYSTEMS:    CONSTITUTIONAL:  Negative for anorexia, chills, fevers, night sweats and weight loss  EYES:  negative for eye dryness, icterus and redness  HEENT:   negative for dental problems, epistaxis, facial trauma and thrush  RESPIRATORY:  negative for chest tightness, cough, dyspnea on exertion, pneumonia and sputum  CARDIOVASCULAR: negative for chest pain, dyspnea, exertional chest pressure/discomfort, irregular heart beat, palpitations, paroxysmal nocturnal dyspnea and syncope  GASTROINTESTINAL:  negative for abdominal pain, hematemesis, jaundice, melena and rectal bleeding  MUSCULOSKELETAL:  negative for muscle weakness, myalgias and neck pain, chronic joint pain noted  NEUROLOGICAL:   negative for dizziness, headaches, seizures, speech problems, tremors and vertigo  INTEGUMENT: negative for pruritus, rash, skin color change and skin lesion(s)   A Full 14 point review of systems is negative outside those listed above and in the HPI      History    Past Medical History:   Diagnosis Date    Anxiety     Arthritis     knee pain    COVID-19 2020    loss taste/smell, mild fever, mild congestion    Nocturia     Thyroid disease      Past  well-developed.   HEENT:   Head: Normocephalic and atraumatic.   Eyes: Pupils are equal, round, and reactive to light.   Neck: Neck supple.   Cardiovascular: Regular rhythm and normal heart sounds.  No lift or rub appreciated.  Pulmonary/Chest: Effort normal and breath sounds normal. CTAB. No labored breating.  Abdominal: Soft. Bowel sounds are normal. There is no appreciable  distension. There is no point tenderness. no rebounding or guarding.   Musculoskeletal: Normal range of motion on other than surgically repaired joint .  no edema or tenderness other than surgical area. Post-op changes noted  Neurological:  alert and oriented to person, place, and time.  normal reflexes. No focal deficits  Skin: Skin is warm and dry. No new rashes appreciated.    Results Review:   I reviewed the patient's new imaging results and agree with the interpretation.          Principal Problem:    Primary osteoarthritis of left knee  Resolved Problems:    * No resolved hospital problems. *      Assessment: 69-year-old male with past medical history of hypothyroidism who presents postoperatively from total knee arthroplasty performed on 10/17.    Plan:    Perioperative care  Starting bowel regimen, encouraged ambulation with physical and occupational therapy.  Counseled on incentive spirometer.  Okay to hep lock and discontinue IVF to encourage more mobility.    Hyperglycemia  Postoperative blood sugar elevated.  I suspect this is related to stress reaction from surgery.    Hypothyroidism  Continue Synthroid.    I completed home medicine reconciliation list and restarted appropriate medications.        I discussed the patients findings and my recommendations with patient/family, staff and the Orthopaedic team.  Thad Denise MD  10/18/24  8:00 AM

## 2024-10-18 NOTE — DISCHARGE SUMMARY
[facility]  Dr. Pj Tran  Discharge Summary    The Grant Loaiza is a 69 y.o. male underwent total knee replacement procedure without complication.  Grant Loaiza was admitted to the floor following his   recovery in the PACU.     Discharge Diagnosis   Left    Knee Replacement    Current Inpatient Medications    Current Facility-Administered Medications: sodium chloride flush 0.9 % injection 5-40 mL, 5-40 mL, IntraVENous, 2 times per day  sodium chloride flush 0.9 % injection 5-40 mL, 5-40 mL, IntraVENous, PRN  0.9 % sodium chloride infusion, , IntraVENous, PRN  acetaminophen (TYLENOL) tablet 650 mg, 650 mg, Oral, Q6H  meloxicam (MOBIC) tablet 3.75 mg, 3.75 mg, Oral, Daily  oxyCODONE (ROXICODONE) immediate release tablet 5 mg, 5 mg, Oral, Q4H PRN **OR** oxyCODONE (ROXICODONE) immediate release tablet 10 mg, 10 mg, Oral, Q4H PRN  morphine injection 2 mg, 2 mg, IntraVENous, Q1H PRN **OR** morphine injection 4 mg, 4 mg, IntraVENous, Q1H PRN  aspirin EC tablet 325 mg, 325 mg, Oral, BID    Post-operatively the patient’s diet was advanced as tolerated and their incision was checked on POD #1.  The incision was clean, dry and intact with no signs of infection.  The patient remained neurovascularly intact in the lower extremity and had intact pulses distally.  Patient’s calf remained soft and showed no evidence of DVT.  The patient was able to move their leg and ankle/foot without any problems post-operatively.  Physical therapy and occupational therapy were consulted and began working with the patient post-operatively.  The patient progressed with PT/OT as would be expected and continued to improve through their stay.  The patients pain was initially controlled with IV medications but we were able to transition to oral pain medications soon after arrival to the floor and their pain remained under good control through their hospital stay.  From a medical standpoint the patient remained

## 2024-10-18 NOTE — PROGRESS NOTES
Physical Therapy     10/18/24 1400   Subjective   Subjective attempted PM tx; pt declined and stated he feels confident about DC home.     Electronically signed by Spencer Molina PTA on 10/18/2024 at 2:04 PM

## 2024-10-18 NOTE — PROGRESS NOTES
Physical Therapy  Facility/Department: [facility] SURG SERVICES  Physical Therapy Initial Assessment    Name: Grant Loaiza  : 1955  MRN: [id number]  Date of Service: 10/18/2024    Discharge Recommendations:  Continue to assess pending progress          Patient Diagnosis(es): The encounter diagnosis was Primary osteoarthritis of left knee.  Past Medical History:  has a past medical history of Anxiety, Arthritis, COVID-19, Nocturia, and Thyroid disease.  Past Surgical History:  has a past surgical history that includes Cataract extraction, extracapsular w/ intraocular lens implant (Bilateral); Tonsillectomy; Ear surgery (Left); Leg Surgery (Left); skin biopsy; cyst removal; Total knee arthroplasty (Right, 2024); Colonoscopy; Appendectomy; and Mandible fracture surgery.    Assessment  Body Structures, Functions, Activity Limitations Requiring Skilled Therapeutic Intervention: Decreased functional mobility ;Decreased ADL status;Decreased ROM;Decreased strength;Decreased endurance;Increased pain  Assessment: Pt ABLE TO AMB IN ROOM WITH CLOSE SUPERVISION. PLANS FOR D/C TODAY.  Requires PT Follow-Up: Yes  Activity Tolerance  Activity Tolerance: Patient limited by pain    Plan  Physical Therapy Plan  General Plan: 5-7 times per week  Current Treatment Recommendations: Strengthening, ROM, Balance training, Functional mobility training, Transfer training, Gait training, Stair training, Safety education & training, Patient/Caregiver education & training  Safety Devices  Type of Devices: Call light within reach, Nurse notified, Heels elevated for pressure relief    Restrictions  Restrictions/Precautions  Restrictions/Precautions: Fall Risk, Weight Bearing  Lower Extremity Weight Bearing Restrictions  Left Lower Extremity Weight Bearing: Weight Bearing As Tolerated     Subjective  Pain: REPORTS 8/10 PAIN, CALLED OUT FOR PAIN MEDS  General  Diagnosis: L TKR  Subjective  Subjective: Pt WILLING TO PARTICIPATE BUT AWAITING  PAIN MEDS         Social/Functional History  Social/Functional History  Lives With: Alone  Home Equipment: Walker - Rolling  ADL Assistance: Independent  Homemaking Assistance: Independent  Homemaking Responsibilities: Yes  Ambulation Assistance: Independent  Transfer Assistance: Independent  Active : Yes  Vision/Hearing  Vision  Vision: Within Functional Limits  Hearing  Hearing: Within functional limits    Cognition   Orientation  Overall Orientation Status: Within Normal Limits  Cognition  Overall Cognitive Status: WNL    Objective  Temp: 97.5 °F (36.4 °C)  Pulse: 69  Heart Rate Source: Monitor  Respirations: 18  SpO2: 99 %  O2 Device: None (Room air)  BP: 123/69  MAP (Calculated): 87  BP Location: Left upper arm  Patient Position: Sitting        Gross Assessment  AROM:  (SEATED L KNEE FLEX ~ 80)  Strength:  (LLE GROSSLY 3/5)                   Bed mobility  Supine to Sit: Modified independent  Transfers  Sit to Stand: Contact guard assistance  Stand to Sit: Contact guard assistance  Bed to Chair: Contact guard assistance  Ambulation  WB Status: FWB  Ambulation  Device: Rolling Walker  Assistance: Contact guard assistance  Quality of Gait: STEP-THROUGH PATTERN, GOOD WEIGHTBEARING  Gait Deviations: Decreased step height  Distance: 12'     Balance  Sitting - Dynamic: Good  Standing - Dynamic: Fair          OutComes Score                                                  AM-PAC - Mobility              Tinneti Score       Goals  Short Term Goals  Time Frame for Short Term Goals: 14 DAYS  Short Term Goal 1: TRANSFERS MOD IND  Short Term Goal 2: ' RW SUPERVISION  Short Term Goal 3: UP/DOWN 3 STEPS CGA       Education  Patient Education  Education Given To: Patient  Education Provided: Role of Therapy;Plan of Care  Barriers to Learning: None      Therapy Time   Individual Concurrent Group Co-treatment   Time In           Time Out           Minutes                   Yessenia Díaz PT

## 2024-10-18 NOTE — PROGRESS NOTES
CLINICAL PHARMACY NOTE: MEDS TO BEDS    Total # of Prescriptions Filled: 3   The following medications were delivered to the patient:  Current Discharge Medication List        START taking these medications    Details   doxycycline hyclate (VIBRA-TABS) 100 MG tablet Take 1 tablet by mouth 2 times daily for 7 days  Qty: 14 tablet, Refills: 0      oxyCODONE (ROXICODONE) 5 MG immediate release tablet Take 1 tablet by mouth every 4 hours as needed for Pain for up to 10 days. Max Daily Amount: 30 mg  Qty: 40 tablet, Refills: 0    Comments: Reduce doses taken as pain becomes manageable  Associated Diagnoses: Primary osteoarthritis of left knee         Aspirin 81mg chewable Take 1 tablet by mouth in the morning and evening. Dispense qty: 60      Additional Documentation:  Handed scripts to patient at bedside. Paid with card.

## 2024-10-18 NOTE — PROGRESS NOTES
Subjective:     Post-Operative Day: 1 No complaints    Objective:     Patient Vitals for the past 24 hrs:   BP Temp Temp src Pulse Resp SpO2 Height Weight   10/18/24 0504 125/68 97 °F (36.1 °C) -- 73 16 99 % -- --   10/18/24 0005 (!) 92/57 97.4 °F (36.3 °C) -- 56 18 97 % -- --   10/17/24 2141 94/69 97 °F (36.1 °C) -- 55 16 98 % -- --   10/17/24 1855 -- -- -- -- -- 98 % -- --   10/17/24 1707 98/81 -- -- 63 16 93 % -- --   10/17/24 1620 91/66 -- -- 55 15 96 % -- --   10/17/24 1615 94/72 -- -- 58 16 97 % -- --   10/17/24 1610 (!) 87/70 -- -- 66 23 97 % -- --   10/17/24 1605 95/70 -- -- 55 13 96 % -- --   10/17/24 1600 105/63 -- -- 61 21 96 % -- --   10/17/24 1555 96/80 97.5 °F (36.4 °C) -- 60 29 100 % -- --   10/17/24 1552 -- -- -- 66 19 96 % -- --   10/17/24 1550 99/64 -- -- 63 17 98 % -- --   10/17/24 1545 96/73 -- -- 68 15 95 % -- --   10/17/24 1540 (!) 87/72 -- -- 74 19 98 % -- --   10/17/24 1535 (!) 150/109 -- -- 72 17 97 % -- --   10/17/24 1530 95/66 -- -- 77 11 99 % -- --   10/17/24 1525 97/60 -- -- 83 16 96 % -- --   10/17/24 1520 (!) 89/60 -- -- 82 23 96 % -- --   10/17/24 1515 (!) 93/57 98.2 °F (36.8 °C) Temporal 81 22 94 % -- --   10/17/24 1300 -- -- -- 70 17 95 % -- --   10/17/24 1255 -- -- -- 71 19 95 % -- --   10/17/24 1250 -- -- -- 65 18 94 % -- --   10/17/24 1245 103/66 -- -- 68 22 94 % -- --   10/17/24 1240 -- -- -- 66 16 95 % -- --   10/17/24 1235 -- -- -- 74 23 94 % -- --   10/17/24 1230 114/77 -- -- 73 13 95 % -- --   10/17/24 1225 107/74 -- -- 78 16 95 % -- --   10/17/24 1220 (!) 78/67 -- -- 82 19 95 % -- --   10/17/24 1215 -- -- -- 78 10 97 % -- --   10/17/24 1005 117/76 97.2 °F (36.2 °C) Temporal 82 18 96 % 1.803 m (5' 11\") 81.2 kg (179 lb)       General: Alert cooperative   Wound: Clean dry intact.  Moderate swelling   Neurovascular: Exam normal   DVT Exam: No evidence of DVT         Data Review:  Recent Labs     10/18/24  0507   HGB 11.8*     Recent Labs     10/18/24  0507      K 4.5

## 2024-10-18 NOTE — CARE COORDINATION
Patient has requested [facility] upon discharge. [facility] has accepted.  [facility] # [phone]  Fax # [phone]  Electronically signed by Steven Altman RN, BSN on 10/18/2024 at 11:31 AM

## 2024-10-18 NOTE — CARE COORDINATION
PACS ride home has been registered for this afternoon and they are currently working on processing the order for today.    Electronically signed by Syed Red MBA, BSW on 10/18/2024 at 11:12 AM

## 2024-10-18 NOTE — ANESTHESIA POSTPROCEDURE EVALUATION
Department of Anesthesiology  Postprocedure Note    Patient: Grant Loaiza  MRN: [id number]  YOB: 1955  Date of evaluation: 10/18/2024    Procedure Summary       Date: 10/17/24 Room / Location: [address]    Anesthesia Start: 1301 Anesthesia Stop: 1517    Procedure: ROBOTIC LEFT TOTAL KNEE ARTHROPLASTY (Left: Knee) Diagnosis:       Bilateral primary osteoarthritis of knee      (Bilateral primary osteoarthritis of knee [M17.0])    Surgeons: Isaac De Souza MD Responsible Provider: Denisha Nunez APRN - CRNA    Anesthesia Type: regional, spinal ASA Status: 2            Anesthesia Type: No value filed.    Guru Phase I: Guru Score: 10    Guru Phase II:      Anesthesia Post Evaluation    Patient location during evaluation: floor  Patient participation: complete - patient participated  Level of consciousness: awake and alert  Pain score: 4  Airway patency: patent  Nausea & Vomiting: no nausea and no vomiting  Cardiovascular status: hemodynamically stable  Respiratory status: acceptable  Hydration status: stable  Multimodal analgesia pain management approach  Pain management: adequate    No notable events documented.

## 2024-10-24 ENCOUNTER — TELEPHONE (OUTPATIENT)
Age: 69
End: 2024-10-24

## 2024-10-24 DIAGNOSIS — T84.84XD PAIN DUE TO TOTAL LEFT KNEE REPLACEMENT, SUBSEQUENT ENCOUNTER: Primary | ICD-10-CM

## 2024-10-24 DIAGNOSIS — Z96.652 PAIN DUE TO TOTAL LEFT KNEE REPLACEMENT, SUBSEQUENT ENCOUNTER: Primary | ICD-10-CM

## 2024-10-24 RX ORDER — OXYCODONE HYDROCHLORIDE 5 MG/1
5 TABLET ORAL EVERY 4 HOURS PRN
Qty: 40 TABLET | Refills: 0 | Status: SHIPPED | OUTPATIENT
Start: 2024-10-24 | End: 2024-11-03

## 2024-10-24 NOTE — TELEPHONE ENCOUNTER
I spoke with Yessenia and gave her Dr. Tran's cell number so she could send a picture of the swollen knee. She also said that patient has some clear drainage and blisters at the surgical site.

## 2024-10-24 NOTE — TELEPHONE ENCOUNTER
Home Healthcare/Yessenia is calling regarding Pt Lt Knee, he has blisters at the surgery site and he is having swelling redness and pain he is also almost out of pain meds

## 2024-11-15 ENCOUNTER — OFFICE VISIT (OUTPATIENT)
Age: 69
End: 2024-11-15

## 2024-11-15 VITALS — WEIGHT: 170.6 LBS | BODY MASS INDEX: 23.88 KG/M2 | HEIGHT: 71 IN

## 2024-11-15 DIAGNOSIS — Z96.652 STATUS POST LEFT KNEE REPLACEMENT: Primary | ICD-10-CM

## 2024-11-15 DIAGNOSIS — M17.12 PRIMARY OSTEOARTHRITIS OF LEFT KNEE: Primary | ICD-10-CM

## 2024-11-15 DIAGNOSIS — Z96.651 STATUS POST RIGHT KNEE REPLACEMENT: ICD-10-CM

## 2024-11-15 PROCEDURE — 99024 POSTOP FOLLOW-UP VISIT: CPT | Performed by: ORTHOPAEDIC SURGERY

## 2024-11-15 NOTE — PROGRESS NOTES
Allergies  Allergies   Allergen Reactions    Demerol Hcl [Meperidine] Other (See Comments)     Made scared    Tuberculin Tests Rash        Review of Systems  System  Neg/Pos  Details  Constitutional  Negative  Chills, Fatigue, Fever and Night Sweats  Respiratory  Negative  Chest Pain, Cough and Dyspnea  Cardio   Negative  Leg Swelling  GI   Negative  Abdominal Pain, Constipation, Nausea and Vomiting     Negative  Urinary Incontinence   Endocrine  Negative  Weight Gain and Weight Loss  MS   Negative  Except as noted in HPI and Chief Complaint      Objective:   White (non-)   Ht Readings from Last 1 Encounters:   11/15/24 1.803 m (5' 11\")    .FLOWAMB[14  Body mass index is 23.79 kg/m².     Pleasant gentleman no distress.  He walks normally without a limp.  His left knee shows a moderate mount of swelling slight warmth.  Incisions clean dry and intact.  There are some healing blisters around the knee.  He has 0 210 degrees range of motion.  Stable.  Neurovascular is normal.  His right knee has excellent range of motion minimal swelling well-healed incision.         Xray: XR KNEE LEFT (MIN 4 VIEWS)  X-rays AP lateral 45 degree PA weightbearing and merchant x-ray views of   the left knee show   Bilateral knee arthroplasties.  Left knee is a hinged revision CCK type   implant.  Stubby on the tibia and the femur.  Implants in good position.    Patellofemoral joint well aligned.         Assessment and Plan:     1. Status post left knee replacement  2. Status post right knee replacement         Return in about 10 weeks (around 1/24/2025).     Doing well after left knee arthroplasty.  This was a complex primary doing remarkably well.  Plan be to see him back in 10 weeks.  Activity as tolerated.  Continue his exercise.  He can stop aspirin.  He can drive a car.  And get the knee under water.          Patient given educational materials - see patient instructions.   Discussed use, benefit, and side effects of

## 2025-01-27 ENCOUNTER — OFFICE VISIT (OUTPATIENT)
Age: 70
End: 2025-01-27
Payer: MEDICARE

## 2025-01-27 VITALS — HEIGHT: 71 IN | BODY MASS INDEX: 24.16 KG/M2 | WEIGHT: 172.6 LBS

## 2025-01-27 DIAGNOSIS — Z96.652 STATUS POST LEFT KNEE REPLACEMENT: Primary | ICD-10-CM

## 2025-01-27 DIAGNOSIS — Z96.651 STATUS POST RIGHT KNEE REPLACEMENT: ICD-10-CM

## 2025-01-27 PROCEDURE — 1159F MED LIST DOCD IN RCRD: CPT | Performed by: ORTHOPAEDIC SURGERY

## 2025-01-27 PROCEDURE — 3017F COLORECTAL CA SCREEN DOC REV: CPT | Performed by: ORTHOPAEDIC SURGERY

## 2025-01-27 PROCEDURE — 99213 OFFICE O/P EST LOW 20 MIN: CPT | Performed by: ORTHOPAEDIC SURGERY

## 2025-01-27 PROCEDURE — G8420 CALC BMI NORM PARAMETERS: HCPCS | Performed by: ORTHOPAEDIC SURGERY

## 2025-01-27 PROCEDURE — 1036F TOBACCO NON-USER: CPT | Performed by: ORTHOPAEDIC SURGERY

## 2025-01-27 PROCEDURE — 1123F ACP DISCUSS/DSCN MKR DOCD: CPT | Performed by: ORTHOPAEDIC SURGERY

## 2025-01-27 PROCEDURE — G8427 DOCREV CUR MEDS BY ELIG CLIN: HCPCS | Performed by: ORTHOPAEDIC SURGERY

## 2025-01-27 NOTE — PROGRESS NOTES
[facility]  [facility]  [address]  [facility] [id number] [phone]         Grant Loaiza (: 1955) is a 69 y.o. male, patient, here for evaluation of the following chief complaint(s): Knee Pain (Left (TKR)/SX: 10/17/24)  .         Patient's PCP: Dennis Mijares APRN - NP     Patient's Last Appointment in this Department was on 11/15/2024      Subjective:     Chief Complaint   Patient presents with    Knee Pain     Left (TKR)  SX: 10/17/24            History of Present Illness  The patient presents for follow-up after a left knee replacement on 10/17/2024.    He reports that his right knee, which was replaced in May of last year is slower to heal than his left knee.. He is not experiencing any issues with his right knee and acknowledges the improvement in his condition post-surgery. 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 He also mentions that he is able to kneel with the aid of knee pads. He has been utilizing a brace for support and finds it beneficial.  He fixed a flat tire on his way here today.    Supplemental Information  He has vision problems and is unable to pass the eye exam for his 's license. He can get a cornea transplant, but if it did not take, then he would lose that 10 percent.          Medications  Current Outpatient Medications   Medication Sig Dispense Refill    aspirin (ASPIRIN CHILDRENS) 81 MG chewable tablet Take 1 tablet by mouth in the morning and at bedtime 60 tablet 0    docusate sodium (COLACE) 100 MG capsule Take 2 capsules by mouth at bedtime      psyllium (KONSYL) 28.3 % PACK Take 1 packet by mouth daily      albuterol sulfate HFA (VENTOLIN HFA) 108 (90 Base) MCG/ACT inhaler Inhale 2 puffs into the lungs every 6 hours as needed for Wheezing      ibuprofen (ADVIL;MOTRIN) 400 MG tablet Take 1 tablet by mouth every 8 hours as needed for Pain 30 tablet 0    Multiple Vitamins-Minerals

## 2025-08-29 ENCOUNTER — OFFICE VISIT (OUTPATIENT)
Age: 70
End: 2025-08-29
Payer: MEDICARE

## 2025-08-29 VITALS — WEIGHT: 176.6 LBS | BODY MASS INDEX: 24.72 KG/M2 | HEIGHT: 71 IN

## 2025-08-29 DIAGNOSIS — M25.562 LEFT KNEE PAIN, UNSPECIFIED CHRONICITY: Primary | ICD-10-CM

## 2025-08-29 DIAGNOSIS — M25.561 RIGHT KNEE PAIN, UNSPECIFIED CHRONICITY: ICD-10-CM

## 2025-08-29 DIAGNOSIS — Z96.652 STATUS POST LEFT KNEE REPLACEMENT: ICD-10-CM

## 2025-08-29 DIAGNOSIS — Z96.651 STATUS POST RIGHT KNEE REPLACEMENT: ICD-10-CM

## 2025-08-29 DIAGNOSIS — I82.811 ACUTE SUPERFICIAL VENOUS THROMBOSIS OF RIGHT LOWER EXTREMITY: ICD-10-CM

## 2025-08-29 PROCEDURE — 1123F ACP DISCUSS/DSCN MKR DOCD: CPT | Performed by: ORTHOPAEDIC SURGERY

## 2025-08-29 PROCEDURE — 1159F MED LIST DOCD IN RCRD: CPT | Performed by: ORTHOPAEDIC SURGERY

## 2025-08-29 PROCEDURE — 1036F TOBACCO NON-USER: CPT | Performed by: ORTHOPAEDIC SURGERY

## 2025-08-29 PROCEDURE — 3017F COLORECTAL CA SCREEN DOC REV: CPT | Performed by: ORTHOPAEDIC SURGERY

## 2025-08-29 PROCEDURE — G8427 DOCREV CUR MEDS BY ELIG CLIN: HCPCS | Performed by: ORTHOPAEDIC SURGERY

## 2025-08-29 PROCEDURE — G8420 CALC BMI NORM PARAMETERS: HCPCS | Performed by: ORTHOPAEDIC SURGERY

## 2025-08-29 PROCEDURE — 99213 OFFICE O/P EST LOW 20 MIN: CPT | Performed by: ORTHOPAEDIC SURGERY

## (undated) DEVICE — GLOVE SURG SZ 85 CRM LTX FREE POLYISOPRENE POLYMER BEAD ANTI

## (undated) DEVICE — TRAY EPI 25GA 3.5IN 0.75% BIPIVCAIN 8.25% D CONTAIN BPA

## (undated) DEVICE — BONE PREPARATION KIT: Brand: BIOPREP

## (undated) DEVICE — SUTURE VICRYL + 3-0 L27IN ABSRB UD PS-2 L19MM 3/8 CIR PRIM VCP427H

## (undated) DEVICE — DUAL CUT SAGITTAL BLADE

## (undated) DEVICE — HANDPIECE SET WITH COAXIAL MULTI-ORIFICE TIP AND SUCTION TUBE: Brand: INTERPULSE

## (undated) DEVICE — BANDAGE COMPR W6INXL15FT BGE E SGL LAYERED CLP CLSR

## (undated) DEVICE — ADHESIVE SKIN CLOSURE WND 8.661X1.5 IN 22 CM LIQUIBAND SECUR

## (undated) DEVICE — PIN FIX STERILE L150MM DIA3.2MM FLUT

## (undated) DEVICE — DRESSING FOAM W4XL12IN SIL RECT ADH WTRPRF FLM BK W/ BORD

## (undated) DEVICE — SOLUTION IRRIG 3000ML 0.9% SOD CHL USP UROMATIC PLAS CONT

## (undated) DEVICE — NEPTUNE E-SEP SMOKE EVACUATION PENCIL, COATED, 70MM BLADE, ROCKER SWITCH: Brand: NEPTUNE E-SEP

## (undated) DEVICE — SURGICAL PROCEDURE PACK KNEE TOT DBD CDS LOURDES HOSP LF

## (undated) DEVICE — GLOVE SURG SZ 85 L12IN FNGR THK79MIL GRN LTX FREE

## (undated) DEVICE — PIN FIX STERILE L80MM DIA3.2MM FLUT CAS

## (undated) DEVICE — SUTURE VICRYL ABSRB BRAID COAT UD CP NO 2 27IN  J195H

## (undated) DEVICE — DRAPE ROSA RBTC UNT 20 DROP

## (undated) DEVICE — NEEDLE ECHOGENIC 20GA L4IN INSUL W/ 30DEG BVL EXTN SET

## (undated) DEVICE — CEMENT MIXING SYSTEM WITH FEMORAL BREAKWAY NOZZLE: Brand: REVOLUTION

## (undated) DEVICE — GLOVE SURG SZ 65 L12IN FNGR THK79MIL GRN LTX FREE

## (undated) DEVICE — GOWN, ORBIS, XLARGE, STERILE: Brand: MEDLINE

## (undated) DEVICE — SHEET,DRAPE,53X77,STERILE: Brand: MEDLINE

## (undated) DEVICE — SCREW BNE L25MM DIA2.5MM KNEE FULL THRD HEX FEM PERSONA (NOT IMPLANTED)

## (undated) DEVICE — GLOVE SURG SZ 85 L12IN FNGR ORTHO 126MIL CRM LTX FREE

## (undated) DEVICE — RECIPROCATING BLADE DOUBLE SIDE (74.0 X 0.77MM)

## (undated) DEVICE — INSTRUMENT KIT ORTHOPEDIC KNEE NAVITRACK

## (undated) DEVICE — PREOP NERVE BLOCK TRAY: Brand: MEDLINE INDUSTRIES, INC.

## (undated) DEVICE — SUTURE VICRYL + SZ 2-0 L36IN ABSRB UD L36MM CT-1 1/2 CIR VCP945H

## (undated) DEVICE — Device